# Patient Record
Sex: FEMALE | Race: AMERICAN INDIAN OR ALASKA NATIVE | NOT HISPANIC OR LATINO | Employment: FULL TIME | URBAN - METROPOLITAN AREA
[De-identification: names, ages, dates, MRNs, and addresses within clinical notes are randomized per-mention and may not be internally consistent; named-entity substitution may affect disease eponyms.]

---

## 2017-02-11 ENCOUNTER — OFFICE VISIT - HEALTHEAST (OUTPATIENT)
Dept: FAMILY MEDICINE | Facility: CLINIC | Age: 32
End: 2017-02-11

## 2017-02-11 DIAGNOSIS — J01.00 ACUTE MAXILLARY SINUSITIS, RECURRENCE NOT SPECIFIED: ICD-10-CM

## 2017-02-13 ENCOUNTER — COMMUNICATION - HEALTHEAST (OUTPATIENT)
Dept: SCHEDULING | Facility: CLINIC | Age: 32
End: 2017-02-13

## 2017-02-14 ENCOUNTER — OFFICE VISIT - HEALTHEAST (OUTPATIENT)
Dept: FAMILY MEDICINE | Facility: CLINIC | Age: 32
End: 2017-02-14

## 2017-02-14 DIAGNOSIS — J32.9 SINUSITIS: ICD-10-CM

## 2017-04-06 ENCOUNTER — OFFICE VISIT - HEALTHEAST (OUTPATIENT)
Dept: FAMILY MEDICINE | Facility: CLINIC | Age: 32
End: 2017-04-06

## 2017-04-06 DIAGNOSIS — M75.52 SHOULDER BURSITIS, LEFT: ICD-10-CM

## 2017-10-05 ENCOUNTER — OFFICE VISIT - HEALTHEAST (OUTPATIENT)
Dept: INTERNAL MEDICINE | Facility: CLINIC | Age: 32
End: 2017-10-05

## 2017-10-05 DIAGNOSIS — Z11.3 ROUTINE SCREENING FOR STI (SEXUALLY TRANSMITTED INFECTION): ICD-10-CM

## 2017-10-05 DIAGNOSIS — E89.0 POSTSURGICAL HYPOTHYROIDISM: ICD-10-CM

## 2017-10-05 LAB — HIV 1+2 AB+HIV1 P24 AG SERPL QL IA: NEGATIVE

## 2017-10-05 ASSESSMENT — MIFFLIN-ST. JEOR: SCORE: 1387.35

## 2017-10-06 LAB
HCV AB SERPL QL IA: NEGATIVE
SYPHILIS RPR SCREEN - HISTORICAL: NORMAL

## 2017-10-09 ENCOUNTER — COMMUNICATION - HEALTHEAST (OUTPATIENT)
Dept: INTERNAL MEDICINE | Facility: CLINIC | Age: 32
End: 2017-10-09

## 2017-10-09 DIAGNOSIS — E03.9 HYPOTHYROIDISM: ICD-10-CM

## 2018-01-17 ENCOUNTER — COMMUNICATION - HEALTHEAST (OUTPATIENT)
Dept: FAMILY MEDICINE | Facility: CLINIC | Age: 33
End: 2018-01-17

## 2018-06-04 ENCOUNTER — COMMUNICATION - HEALTHEAST (OUTPATIENT)
Dept: INTERNAL MEDICINE | Facility: CLINIC | Age: 33
End: 2018-06-04

## 2018-06-04 DIAGNOSIS — E03.9 HYPOTHYROIDISM: ICD-10-CM

## 2018-06-07 ENCOUNTER — COMMUNICATION - HEALTHEAST (OUTPATIENT)
Dept: INTERNAL MEDICINE | Facility: CLINIC | Age: 33
End: 2018-06-07

## 2018-10-04 ENCOUNTER — COMMUNICATION - HEALTHEAST (OUTPATIENT)
Dept: ADMINISTRATIVE | Facility: CLINIC | Age: 33
End: 2018-10-04

## 2018-10-04 ENCOUNTER — OFFICE VISIT - HEALTHEAST (OUTPATIENT)
Dept: FAMILY MEDICINE | Facility: CLINIC | Age: 33
End: 2018-10-04

## 2018-10-04 ENCOUNTER — COMMUNICATION - HEALTHEAST (OUTPATIENT)
Dept: FAMILY MEDICINE | Facility: CLINIC | Age: 33
End: 2018-10-04

## 2018-10-04 ENCOUNTER — AMBULATORY - HEALTHEAST (OUTPATIENT)
Dept: FAMILY MEDICINE | Facility: CLINIC | Age: 33
End: 2018-10-04

## 2018-10-04 DIAGNOSIS — Z13.220 SCREENING FOR LIPID DISORDERS: ICD-10-CM

## 2018-10-04 DIAGNOSIS — E89.0 POSTOPERATIVE HYPOTHYROIDISM: ICD-10-CM

## 2018-10-04 DIAGNOSIS — E20.9 HYPOPARATHYROIDISM (H): ICD-10-CM

## 2018-10-04 DIAGNOSIS — M25.50 MULTIPLE JOINT PAIN: ICD-10-CM

## 2018-10-04 DIAGNOSIS — E05.00 GRAVES DISEASE: ICD-10-CM

## 2018-10-04 DIAGNOSIS — N92.0 HEAVY PERIODS: ICD-10-CM

## 2018-10-04 LAB
ALBUMIN SERPL-MCNC: 4.3 G/DL (ref 3.5–5)
ALP SERPL-CCNC: 75 U/L (ref 45–120)
ALT SERPL W P-5'-P-CCNC: 18 U/L (ref 0–45)
ANION GAP SERPL CALCULATED.3IONS-SCNC: 9 MMOL/L (ref 5–18)
AST SERPL W P-5'-P-CCNC: 9 U/L (ref 0–40)
BASOPHILS # BLD AUTO: 0 THOU/UL (ref 0–0.2)
BASOPHILS NFR BLD AUTO: 1 % (ref 0–2)
BILIRUB SERPL-MCNC: 0.4 MG/DL (ref 0–1)
BUN SERPL-MCNC: 13 MG/DL (ref 8–22)
CALCIUM SERPL-MCNC: 9 MG/DL (ref 8.5–10.5)
CHLORIDE BLD-SCNC: 104 MMOL/L (ref 98–107)
CHOLEST SERPL-MCNC: 221 MG/DL
CO2 SERPL-SCNC: 26 MMOL/L (ref 22–31)
CREAT SERPL-MCNC: 0.68 MG/DL (ref 0.6–1.1)
EOSINOPHIL # BLD AUTO: 0.2 THOU/UL (ref 0–0.4)
EOSINOPHIL NFR BLD AUTO: 4 % (ref 0–6)
ERYTHROCYTE [DISTWIDTH] IN BLOOD BY AUTOMATED COUNT: 11 % (ref 11–14.5)
ESTRADIOL SERPL-MCNC: 88 PG/ML
FASTING STATUS PATIENT QL REPORTED: YES
FSH SERPL-ACNC: 3.7 MIU/ML
GFR SERPL CREATININE-BSD FRML MDRD: >60 ML/MIN/1.73M2
GLUCOSE BLD-MCNC: 85 MG/DL (ref 70–125)
HCT VFR BLD AUTO: 36 % (ref 35–47)
HDLC SERPL-MCNC: 52 MG/DL
HGB BLD-MCNC: 12.1 G/DL (ref 12–16)
LDLC SERPL CALC-MCNC: 145 MG/DL
LH SERPL-ACNC: 10.6 MIU/ML
LYMPHOCYTES # BLD AUTO: 2.2 THOU/UL (ref 0.8–4.4)
LYMPHOCYTES NFR BLD AUTO: 33 % (ref 20–40)
MCH RBC QN AUTO: 30.9 PG (ref 27–34)
MCHC RBC AUTO-ENTMCNC: 33.7 G/DL (ref 32–36)
MCV RBC AUTO: 91 FL (ref 80–100)
MONOCYTES # BLD AUTO: 0.4 THOU/UL (ref 0–0.9)
MONOCYTES NFR BLD AUTO: 7 % (ref 2–10)
NEUTROPHILS # BLD AUTO: 3.7 THOU/UL (ref 2–7.7)
NEUTROPHILS NFR BLD AUTO: 57 % (ref 50–70)
PLATELET # BLD AUTO: 272 THOU/UL (ref 140–440)
PMV BLD AUTO: 7.6 FL (ref 7–10)
POTASSIUM BLD-SCNC: 4 MMOL/L (ref 3.5–5)
PROGEST SERPL-MCNC: 5.3 NG/ML
PROT SERPL-MCNC: 7.8 G/DL (ref 6–8)
RBC # BLD AUTO: 3.94 MILL/UL (ref 3.8–5.4)
SODIUM SERPL-SCNC: 139 MMOL/L (ref 136–145)
T4 FREE SERPL-MCNC: 1 NG/DL (ref 0.7–1.8)
TRIGL SERPL-MCNC: 122 MG/DL
TSH SERPL DL<=0.005 MIU/L-ACNC: 5.88 UIU/ML (ref 0.3–5)
WBC: 6.6 THOU/UL (ref 4–11)

## 2018-10-04 ASSESSMENT — MIFFLIN-ST. JEOR: SCORE: 1387.35

## 2018-11-02 ENCOUNTER — OFFICE VISIT - HEALTHEAST (OUTPATIENT)
Dept: FAMILY MEDICINE | Facility: CLINIC | Age: 33
End: 2018-11-02

## 2018-11-02 DIAGNOSIS — L72.0 EPIDERMAL CYST: ICD-10-CM

## 2018-11-09 ENCOUNTER — COMMUNICATION - HEALTHEAST (OUTPATIENT)
Dept: FAMILY MEDICINE | Facility: CLINIC | Age: 33
End: 2018-11-09

## 2018-11-09 ENCOUNTER — RECORDS - HEALTHEAST (OUTPATIENT)
Dept: ADMINISTRATIVE | Facility: OTHER | Age: 33
End: 2018-11-09

## 2018-11-09 ENCOUNTER — AMBULATORY - HEALTHEAST (OUTPATIENT)
Dept: FAMILY MEDICINE | Facility: CLINIC | Age: 33
End: 2018-11-09

## 2018-11-09 DIAGNOSIS — L72.0 EPIDERMAL CYST: ICD-10-CM

## 2018-11-20 ENCOUNTER — OFFICE VISIT - HEALTHEAST (OUTPATIENT)
Dept: FAMILY MEDICINE | Facility: CLINIC | Age: 33
End: 2018-11-20

## 2018-11-20 DIAGNOSIS — L72.0 EPIDERMAL CYST: ICD-10-CM

## 2018-11-20 DIAGNOSIS — Z48.02 VISIT FOR SUTURE REMOVAL: ICD-10-CM

## 2018-12-17 ENCOUNTER — OFFICE VISIT - HEALTHEAST (OUTPATIENT)
Dept: FAMILY MEDICINE | Facility: CLINIC | Age: 33
End: 2018-12-17

## 2018-12-17 DIAGNOSIS — G47.09 OTHER INSOMNIA: ICD-10-CM

## 2018-12-17 DIAGNOSIS — E03.4 HYPOTHYROIDISM DUE TO ACQUIRED ATROPHY OF THYROID: ICD-10-CM

## 2018-12-17 LAB — TSH SERPL DL<=0.005 MIU/L-ACNC: 0.72 UIU/ML (ref 0.3–5)

## 2018-12-18 ENCOUNTER — COMMUNICATION - HEALTHEAST (OUTPATIENT)
Dept: FAMILY MEDICINE | Facility: CLINIC | Age: 33
End: 2018-12-18

## 2019-01-02 ENCOUNTER — COMMUNICATION - HEALTHEAST (OUTPATIENT)
Dept: FAMILY MEDICINE | Facility: CLINIC | Age: 34
End: 2019-01-02

## 2019-01-09 ENCOUNTER — COMMUNICATION - HEALTHEAST (OUTPATIENT)
Dept: FAMILY MEDICINE | Facility: CLINIC | Age: 34
End: 2019-01-09

## 2019-01-09 DIAGNOSIS — M62.838 MUSCLE SPASM: ICD-10-CM

## 2019-01-31 ENCOUNTER — COMMUNICATION - HEALTHEAST (OUTPATIENT)
Dept: ALLERGY | Facility: CLINIC | Age: 34
End: 2019-01-31

## 2019-02-05 ENCOUNTER — COMMUNICATION - HEALTHEAST (OUTPATIENT)
Dept: FAMILY MEDICINE | Facility: CLINIC | Age: 34
End: 2019-02-05

## 2019-02-05 DIAGNOSIS — M62.838 MUSCLE SPASM: ICD-10-CM

## 2019-03-06 ENCOUNTER — COMMUNICATION - HEALTHEAST (OUTPATIENT)
Dept: FAMILY MEDICINE | Facility: CLINIC | Age: 34
End: 2019-03-06

## 2019-03-06 DIAGNOSIS — E89.0 POSTOPERATIVE HYPOTHYROIDISM: ICD-10-CM

## 2019-03-14 ENCOUNTER — OFFICE VISIT - HEALTHEAST (OUTPATIENT)
Dept: FAMILY MEDICINE | Facility: CLINIC | Age: 34
End: 2019-03-14

## 2019-03-14 DIAGNOSIS — E20.89 OTHER HYPOPARATHYROIDISM (H): ICD-10-CM

## 2019-03-16 ENCOUNTER — COMMUNICATION - HEALTHEAST (OUTPATIENT)
Dept: FAMILY MEDICINE | Facility: CLINIC | Age: 34
End: 2019-03-16

## 2019-03-16 DIAGNOSIS — M62.838 MUSCLE SPASM: ICD-10-CM

## 2019-03-18 ENCOUNTER — COMMUNICATION - HEALTHEAST (OUTPATIENT)
Dept: FAMILY MEDICINE | Facility: CLINIC | Age: 34
End: 2019-03-18

## 2019-05-08 ENCOUNTER — COMMUNICATION - HEALTHEAST (OUTPATIENT)
Dept: FAMILY MEDICINE | Facility: CLINIC | Age: 34
End: 2019-05-08

## 2019-05-08 DIAGNOSIS — E89.0 POSTOPERATIVE HYPOTHYROIDISM: ICD-10-CM

## 2019-06-05 ENCOUNTER — COMMUNICATION - HEALTHEAST (OUTPATIENT)
Dept: FAMILY MEDICINE | Facility: CLINIC | Age: 34
End: 2019-06-05

## 2019-06-05 DIAGNOSIS — M62.838 MUSCLE SPASM: ICD-10-CM

## 2019-06-26 ENCOUNTER — COMMUNICATION - HEALTHEAST (OUTPATIENT)
Dept: FAMILY MEDICINE | Facility: CLINIC | Age: 34
End: 2019-06-26

## 2019-06-26 DIAGNOSIS — G47.00 INSOMNIA, UNSPECIFIED TYPE: ICD-10-CM

## 2019-06-26 DIAGNOSIS — M62.838 MUSCLE SPASM: ICD-10-CM

## 2019-06-28 ENCOUNTER — COMMUNICATION - HEALTHEAST (OUTPATIENT)
Dept: FAMILY MEDICINE | Facility: CLINIC | Age: 34
End: 2019-06-28

## 2019-07-03 ENCOUNTER — COMMUNICATION - HEALTHEAST (OUTPATIENT)
Dept: FAMILY MEDICINE | Facility: CLINIC | Age: 34
End: 2019-07-03

## 2019-07-03 DIAGNOSIS — G47.00 INSOMNIA, UNSPECIFIED TYPE: ICD-10-CM

## 2019-07-11 ENCOUNTER — OFFICE VISIT - HEALTHEAST (OUTPATIENT)
Dept: FAMILY MEDICINE | Facility: CLINIC | Age: 34
End: 2019-07-11

## 2019-07-11 ENCOUNTER — COMMUNICATION - HEALTHEAST (OUTPATIENT)
Dept: FAMILY MEDICINE | Facility: CLINIC | Age: 34
End: 2019-07-11

## 2019-07-11 DIAGNOSIS — M62.838 MUSCLE SPASM: ICD-10-CM

## 2019-07-11 DIAGNOSIS — E05.00 GRAVES DISEASE: ICD-10-CM

## 2019-07-11 DIAGNOSIS — M62.838 NECK MUSCLE SPASM: ICD-10-CM

## 2019-07-11 DIAGNOSIS — M77.8 TENDONITIS OF WRIST, LEFT: ICD-10-CM

## 2019-07-11 DIAGNOSIS — M77.8 TENDONITIS OF WRIST, RIGHT: ICD-10-CM

## 2019-07-11 ASSESSMENT — MIFFLIN-ST. JEOR: SCORE: 1369.66

## 2019-07-15 ENCOUNTER — COMMUNICATION - HEALTHEAST (OUTPATIENT)
Dept: FAMILY MEDICINE | Facility: CLINIC | Age: 34
End: 2019-07-15

## 2019-07-15 DIAGNOSIS — M25.539 PAIN IN WRIST, UNSPECIFIED LATERALITY: ICD-10-CM

## 2019-07-23 ENCOUNTER — COMMUNICATION - HEALTHEAST (OUTPATIENT)
Dept: FAMILY MEDICINE | Facility: CLINIC | Age: 34
End: 2019-07-23

## 2019-07-30 ENCOUNTER — OFFICE VISIT - HEALTHEAST (OUTPATIENT)
Dept: FAMILY MEDICINE | Facility: CLINIC | Age: 34
End: 2019-07-30

## 2019-07-30 ENCOUNTER — COMMUNICATION - HEALTHEAST (OUTPATIENT)
Dept: FAMILY MEDICINE | Facility: CLINIC | Age: 34
End: 2019-07-30

## 2019-07-30 DIAGNOSIS — F41.1 GAD (GENERALIZED ANXIETY DISORDER): ICD-10-CM

## 2019-07-30 DIAGNOSIS — G47.00 INSOMNIA, UNSPECIFIED TYPE: ICD-10-CM

## 2019-07-30 DIAGNOSIS — E05.00 GRAVES DISEASE: ICD-10-CM

## 2019-07-30 LAB — TSH SERPL DL<=0.005 MIU/L-ACNC: 0.48 UIU/ML (ref 0.3–5)

## 2019-08-01 ENCOUNTER — COMMUNICATION - HEALTHEAST (OUTPATIENT)
Dept: FAMILY MEDICINE | Facility: CLINIC | Age: 34
End: 2019-08-01

## 2019-08-01 DIAGNOSIS — M62.838 MUSCLE SPASM: ICD-10-CM

## 2019-09-17 ENCOUNTER — COMMUNICATION - HEALTHEAST (OUTPATIENT)
Dept: FAMILY MEDICINE | Facility: CLINIC | Age: 34
End: 2019-09-17

## 2019-09-17 ENCOUNTER — COMMUNICATION - HEALTHEAST (OUTPATIENT)
Dept: SCHEDULING | Facility: CLINIC | Age: 34
End: 2019-09-17

## 2019-09-17 ENCOUNTER — OFFICE VISIT - HEALTHEAST (OUTPATIENT)
Dept: FAMILY MEDICINE | Facility: CLINIC | Age: 34
End: 2019-09-17

## 2019-09-17 DIAGNOSIS — J30.89 NON-SEASONAL ALLERGIC RHINITIS DUE TO OTHER ALLERGIC TRIGGER: ICD-10-CM

## 2019-09-17 DIAGNOSIS — J45.901 MILD ASTHMA WITH EXACERBATION, UNSPECIFIED WHETHER PERSISTENT: ICD-10-CM

## 2019-09-17 DIAGNOSIS — R07.89 BURNING CHEST PAIN: ICD-10-CM

## 2019-09-17 DIAGNOSIS — M62.838 MUSCLE SPASM: ICD-10-CM

## 2019-10-08 ENCOUNTER — HOSPITAL ENCOUNTER (OUTPATIENT)
Dept: RESPIRATORY THERAPY | Facility: HOSPITAL | Age: 34
Discharge: HOME | End: 2019-10-08

## 2019-10-08 ENCOUNTER — COMMUNICATION - HEALTHEAST (OUTPATIENT)
Dept: FAMILY MEDICINE | Facility: CLINIC | Age: 34
End: 2019-10-08

## 2019-10-08 DIAGNOSIS — J45.901 MILD ASTHMA WITH EXACERBATION, UNSPECIFIED WHETHER PERSISTENT: ICD-10-CM

## 2019-10-08 LAB — HGB BLD-MCNC: 11.9 G/DL (ref 12–16)

## 2019-10-10 ENCOUNTER — COMMUNICATION - HEALTHEAST (OUTPATIENT)
Dept: FAMILY MEDICINE | Facility: CLINIC | Age: 34
End: 2019-10-10

## 2019-11-08 ENCOUNTER — COMMUNICATION - HEALTHEAST (OUTPATIENT)
Dept: FAMILY MEDICINE | Facility: CLINIC | Age: 34
End: 2019-11-08

## 2019-11-08 DIAGNOSIS — G47.00 INSOMNIA, UNSPECIFIED TYPE: ICD-10-CM

## 2019-11-08 DIAGNOSIS — E89.0 POSTOPERATIVE HYPOTHYROIDISM: ICD-10-CM

## 2019-12-04 ENCOUNTER — COMMUNICATION - HEALTHEAST (OUTPATIENT)
Dept: FAMILY MEDICINE | Facility: CLINIC | Age: 34
End: 2019-12-04

## 2019-12-04 DIAGNOSIS — G47.00 INSOMNIA, UNSPECIFIED TYPE: ICD-10-CM

## 2019-12-09 ENCOUNTER — OFFICE VISIT - HEALTHEAST (OUTPATIENT)
Dept: FAMILY MEDICINE | Facility: CLINIC | Age: 34
End: 2019-12-09

## 2019-12-09 DIAGNOSIS — J02.9 SORE THROAT: ICD-10-CM

## 2019-12-09 DIAGNOSIS — J01.40 ACUTE NON-RECURRENT PANSINUSITIS: ICD-10-CM

## 2019-12-09 LAB — DEPRECATED S PYO AG THROAT QL EIA: NORMAL

## 2019-12-10 LAB — GROUP A STREP BY PCR: NORMAL

## 2020-01-07 ENCOUNTER — COMMUNICATION - HEALTHEAST (OUTPATIENT)
Dept: LAB | Facility: CLINIC | Age: 35
End: 2020-01-07

## 2020-01-07 DIAGNOSIS — E03.9 ACQUIRED HYPOTHYROIDISM: ICD-10-CM

## 2020-01-07 DIAGNOSIS — Z13.21 ENCOUNTER FOR VITAMIN DEFICIENCY SCREENING: ICD-10-CM

## 2020-01-09 ENCOUNTER — AMBULATORY - HEALTHEAST (OUTPATIENT)
Dept: LAB | Facility: CLINIC | Age: 35
End: 2020-01-09

## 2020-01-09 DIAGNOSIS — Z13.21 ENCOUNTER FOR VITAMIN DEFICIENCY SCREENING: ICD-10-CM

## 2020-01-09 DIAGNOSIS — E03.9 ACQUIRED HYPOTHYROIDISM: ICD-10-CM

## 2020-01-09 LAB
CALCIUM SERPL-MCNC: 8.6 MG/DL (ref 8.5–10.5)
TSH SERPL DL<=0.005 MIU/L-ACNC: 1.35 UIU/ML (ref 0.3–5)

## 2020-01-10 ENCOUNTER — COMMUNICATION - HEALTHEAST (OUTPATIENT)
Dept: FAMILY MEDICINE | Facility: CLINIC | Age: 35
End: 2020-01-10

## 2020-01-10 DIAGNOSIS — R79.89 LOW VITAMIN D LEVEL: ICD-10-CM

## 2020-01-10 LAB — 25(OH)D3 SERPL-MCNC: 19.9 NG/ML (ref 30–80)

## 2020-01-16 ENCOUNTER — COMMUNICATION - HEALTHEAST (OUTPATIENT)
Dept: FAMILY MEDICINE | Facility: CLINIC | Age: 35
End: 2020-01-16

## 2020-02-01 ENCOUNTER — COMMUNICATION - HEALTHEAST (OUTPATIENT)
Dept: FAMILY MEDICINE | Facility: CLINIC | Age: 35
End: 2020-02-01

## 2020-02-01 DIAGNOSIS — M62.838 MUSCLE SPASM: ICD-10-CM

## 2020-03-25 ENCOUNTER — COMMUNICATION - HEALTHEAST (OUTPATIENT)
Dept: FAMILY MEDICINE | Facility: CLINIC | Age: 35
End: 2020-03-25

## 2020-03-25 DIAGNOSIS — G47.00 INSOMNIA, UNSPECIFIED TYPE: ICD-10-CM

## 2020-03-27 ENCOUNTER — COMMUNICATION - HEALTHEAST (OUTPATIENT)
Dept: FAMILY MEDICINE | Facility: CLINIC | Age: 35
End: 2020-03-27

## 2020-04-21 ENCOUNTER — COMMUNICATION - HEALTHEAST (OUTPATIENT)
Dept: FAMILY MEDICINE | Facility: CLINIC | Age: 35
End: 2020-04-21

## 2020-04-21 ENCOUNTER — RECORDS - HEALTHEAST (OUTPATIENT)
Dept: FAMILY MEDICINE | Facility: CLINIC | Age: 35
End: 2020-04-21

## 2020-04-21 DIAGNOSIS — M62.838 MUSCLE SPASM: ICD-10-CM

## 2020-04-21 DIAGNOSIS — R52 PAIN: ICD-10-CM

## 2020-04-21 DIAGNOSIS — G47.00 INSOMNIA, UNSPECIFIED TYPE: ICD-10-CM

## 2020-04-22 ENCOUNTER — COMMUNICATION - HEALTHEAST (OUTPATIENT)
Dept: FAMILY MEDICINE | Facility: CLINIC | Age: 35
End: 2020-04-22

## 2020-04-22 DIAGNOSIS — G47.00 INSOMNIA, UNSPECIFIED TYPE: ICD-10-CM

## 2020-04-24 ENCOUNTER — COMMUNICATION - HEALTHEAST (OUTPATIENT)
Dept: FAMILY MEDICINE | Facility: CLINIC | Age: 35
End: 2020-04-24

## 2020-04-24 DIAGNOSIS — G47.00 INSOMNIA, UNSPECIFIED TYPE: ICD-10-CM

## 2020-04-29 ENCOUNTER — OFFICE VISIT - HEALTHEAST (OUTPATIENT)
Dept: FAMILY MEDICINE | Facility: CLINIC | Age: 35
End: 2020-04-29

## 2020-04-29 DIAGNOSIS — Z20.822 EXPOSURE TO COVID-19 VIRUS: ICD-10-CM

## 2020-04-29 DIAGNOSIS — M65.30 TRIGGER FINGER, ACQUIRED: ICD-10-CM

## 2020-04-29 DIAGNOSIS — G47.00 INSOMNIA, UNSPECIFIED TYPE: ICD-10-CM

## 2020-07-06 ENCOUNTER — COMMUNICATION - HEALTHEAST (OUTPATIENT)
Dept: SCHEDULING | Facility: CLINIC | Age: 35
End: 2020-07-06

## 2020-07-07 ENCOUNTER — OFFICE VISIT - HEALTHEAST (OUTPATIENT)
Dept: FAMILY MEDICINE | Facility: CLINIC | Age: 35
End: 2020-07-07

## 2020-07-07 ENCOUNTER — HOSPITAL ENCOUNTER (OUTPATIENT)
Dept: ULTRASOUND IMAGING | Facility: HOSPITAL | Age: 35
Discharge: HOME | End: 2020-07-07
Attending: FAMILY MEDICINE

## 2020-07-07 DIAGNOSIS — Z20.822 EXPOSURE TO COVID-19 VIRUS: ICD-10-CM

## 2020-07-07 DIAGNOSIS — R10.2 PELVIC PAIN IN FEMALE: ICD-10-CM

## 2020-07-07 DIAGNOSIS — E89.0 POSTSURGICAL HYPOTHYROIDISM: ICD-10-CM

## 2020-07-07 LAB
CLUE CELLS: NORMAL
ERYTHROCYTE [DISTWIDTH] IN BLOOD BY AUTOMATED COUNT: 12.1 % (ref 11–14.5)
FERRITIN SERPL-MCNC: 21 NG/ML (ref 10–130)
HCT VFR BLD AUTO: 35.4 % (ref 35–47)
HGB BLD-MCNC: 12.1 G/DL (ref 12–16)
MCH RBC QN AUTO: 30.9 PG (ref 27–34)
MCHC RBC AUTO-ENTMCNC: 34.1 G/DL (ref 32–36)
MCV RBC AUTO: 90 FL (ref 80–100)
PLATELET # BLD AUTO: 301 THOU/UL (ref 140–440)
PMV BLD AUTO: 7.4 FL (ref 7–10)
RBC # BLD AUTO: 3.91 MILL/UL (ref 3.8–5.4)
TRICHOMONAS, WET PREP: NORMAL
TSH SERPL DL<=0.005 MIU/L-ACNC: 1.34 UIU/ML (ref 0.3–5)
WBC: 9.3 THOU/UL (ref 4–11)
YEAST, WET PREP: NORMAL

## 2020-07-07 ASSESSMENT — MIFFLIN-ST. JEOR: SCORE: 1346.53

## 2020-07-08 LAB
C TRACH DNA SPEC QL PROBE+SIG AMP: NEGATIVE
HPV SOURCE: NORMAL
HUMAN PAPILLOMA VIRUS 16 DNA: NEGATIVE
HUMAN PAPILLOMA VIRUS 18 DNA: NEGATIVE
HUMAN PAPILLOMA VIRUS FINAL DIAGNOSIS: NORMAL
HUMAN PAPILLOMA VIRUS OTHER HR: NEGATIVE
N GONORRHOEA DNA SPEC QL NAA+PROBE: NEGATIVE
SPECIMEN DESCRIPTION: NORMAL

## 2020-07-09 ENCOUNTER — COMMUNICATION - HEALTHEAST (OUTPATIENT)
Dept: SCHEDULING | Facility: CLINIC | Age: 35
End: 2020-07-09

## 2020-07-10 ENCOUNTER — COMMUNICATION - HEALTHEAST (OUTPATIENT)
Dept: FAMILY MEDICINE | Facility: CLINIC | Age: 35
End: 2020-07-10

## 2020-07-14 LAB
BKR LAB AP ABNORMAL BLEEDING: NO
BKR LAB AP BIRTH CONTROL/HORMONES: NORMAL
BKR LAB AP CERVICAL APPEARANCE: NORMAL
BKR LAB AP GYN ADEQUACY: NORMAL
BKR LAB AP GYN INTERPRETATION: NORMAL
BKR LAB AP HPV REFLEX: NORMAL
BKR LAB AP LMP: NORMAL
BKR LAB AP PATIENT STATUS: NO
BKR LAB AP PREVIOUS ABNORMAL: NO
BKR LAB AP PREVIOUS NORMAL: NORMAL
HIGH RISK?: NO
PATH REPORT.COMMENTS IMP SPEC: NORMAL
RESULT FLAG (HE HISTORICAL CONVERSION): NORMAL

## 2020-07-20 ENCOUNTER — AMBULATORY - HEALTHEAST (OUTPATIENT)
Dept: SURGERY | Facility: HOSPITAL | Age: 35
End: 2020-07-20

## 2020-07-20 DIAGNOSIS — Z11.59 ENCOUNTER FOR SCREENING FOR OTHER VIRAL DISEASES: ICD-10-CM

## 2020-07-31 ENCOUNTER — OFFICE VISIT - HEALTHEAST (OUTPATIENT)
Dept: FAMILY MEDICINE | Facility: CLINIC | Age: 35
End: 2020-07-31

## 2020-07-31 ENCOUNTER — AMBULATORY - HEALTHEAST (OUTPATIENT)
Dept: LAB | Facility: CLINIC | Age: 35
End: 2020-07-31

## 2020-07-31 DIAGNOSIS — Z01.818 PRE-OP EXAM: ICD-10-CM

## 2020-07-31 DIAGNOSIS — D25.9 UTERINE LEIOMYOMA, UNSPECIFIED LOCATION: ICD-10-CM

## 2020-07-31 LAB
ANION GAP SERPL CALCULATED.3IONS-SCNC: 8 MMOL/L (ref 5–18)
BUN SERPL-MCNC: 14 MG/DL (ref 8–22)
CALCIUM SERPL-MCNC: 8.6 MG/DL (ref 8.5–10.5)
CHLORIDE BLD-SCNC: 104 MMOL/L (ref 98–107)
CO2 SERPL-SCNC: 25 MMOL/L (ref 22–31)
CREAT SERPL-MCNC: 0.65 MG/DL (ref 0.6–1.1)
ERYTHROCYTE [DISTWIDTH] IN BLOOD BY AUTOMATED COUNT: 11.3 % (ref 11–14.5)
GFR SERPL CREATININE-BSD FRML MDRD: >60 ML/MIN/1.73M2
GLUCOSE BLD-MCNC: 78 MG/DL (ref 70–125)
HCT VFR BLD AUTO: 36.1 % (ref 35–47)
HGB BLD-MCNC: 12.4 G/DL (ref 12–16)
MCH RBC QN AUTO: 30.4 PG (ref 27–34)
MCHC RBC AUTO-ENTMCNC: 34.5 G/DL (ref 32–36)
MCV RBC AUTO: 88 FL (ref 80–100)
PLATELET # BLD AUTO: 270 THOU/UL (ref 140–440)
PMV BLD AUTO: 7.4 FL (ref 7–10)
POTASSIUM BLD-SCNC: 3.9 MMOL/L (ref 3.5–5)
RBC # BLD AUTO: 4.1 MILL/UL (ref 3.8–5.4)
SODIUM SERPL-SCNC: 137 MMOL/L (ref 136–145)
WBC: 7.4 THOU/UL (ref 4–11)

## 2020-07-31 ASSESSMENT — MIFFLIN-ST. JEOR: SCORE: 1354.58

## 2020-08-01 ENCOUNTER — COMMUNICATION - HEALTHEAST (OUTPATIENT)
Dept: LAB | Facility: CLINIC | Age: 35
End: 2020-08-01

## 2020-08-01 DIAGNOSIS — Z01.818 PRE-OP EXAM: ICD-10-CM

## 2020-08-03 ENCOUNTER — COMMUNICATION - HEALTHEAST (OUTPATIENT)
Dept: FAMILY MEDICINE | Facility: CLINIC | Age: 35
End: 2020-08-03

## 2020-08-03 DIAGNOSIS — G47.00 INSOMNIA, UNSPECIFIED TYPE: ICD-10-CM

## 2020-08-09 ENCOUNTER — AMBULATORY - HEALTHEAST (OUTPATIENT)
Dept: FAMILY MEDICINE | Facility: CLINIC | Age: 35
End: 2020-08-09

## 2020-08-09 DIAGNOSIS — Z11.59 ENCOUNTER FOR SCREENING FOR OTHER VIRAL DISEASES: ICD-10-CM

## 2020-08-10 LAB
SARS-COV-2 PCR COMMENT: NORMAL
SARS-COV-2 RNA SPEC QL NAA+PROBE: NEGATIVE
SARS-COV-2 VIRUS SPECIMEN SOURCE: NORMAL

## 2020-08-10 ASSESSMENT — MIFFLIN-ST. JEOR: SCORE: 1351.13

## 2020-08-11 ENCOUNTER — ANESTHESIA - HEALTHEAST (OUTPATIENT)
Dept: SURGERY | Facility: HOSPITAL | Age: 35
End: 2020-08-11

## 2020-08-12 ENCOUNTER — COMMUNICATION - HEALTHEAST (OUTPATIENT)
Dept: SCHEDULING | Facility: CLINIC | Age: 35
End: 2020-08-12

## 2020-08-12 ENCOUNTER — SURGERY - HEALTHEAST (OUTPATIENT)
Dept: SURGERY | Facility: HOSPITAL | Age: 35
End: 2020-08-12

## 2020-08-12 ASSESSMENT — MIFFLIN-ST. JEOR: SCORE: 1344.54

## 2020-09-27 ENCOUNTER — COMMUNICATION - HEALTHEAST (OUTPATIENT)
Dept: SCHEDULING | Facility: CLINIC | Age: 35
End: 2020-09-27

## 2020-09-27 ASSESSMENT — MIFFLIN-ST. JEOR
SCORE: 1329.53
SCORE: 1330.89

## 2020-09-28 ENCOUNTER — ANESTHESIA - HEALTHEAST (OUTPATIENT)
Dept: SURGERY | Facility: HOSPITAL | Age: 35
End: 2020-09-28

## 2020-09-28 ENCOUNTER — SURGERY - HEALTHEAST (OUTPATIENT)
Dept: SURGERY | Facility: HOSPITAL | Age: 35
End: 2020-09-28

## 2020-09-29 ENCOUNTER — COMMUNICATION - HEALTHEAST (OUTPATIENT)
Dept: SCHEDULING | Facility: CLINIC | Age: 35
End: 2020-09-29

## 2020-10-01 ENCOUNTER — NURSE TRIAGE (OUTPATIENT)
Dept: NURSING | Facility: CLINIC | Age: 35
End: 2020-10-01

## 2020-10-01 ENCOUNTER — COMMUNICATION - HEALTHEAST (OUTPATIENT)
Dept: SCHEDULING | Facility: CLINIC | Age: 35
End: 2020-10-01

## 2020-10-02 ENCOUNTER — COMMUNICATION - HEALTHEAST (OUTPATIENT)
Dept: SCHEDULING | Facility: CLINIC | Age: 35
End: 2020-10-02

## 2020-10-14 ENCOUNTER — COMMUNICATION - HEALTHEAST (OUTPATIENT)
Dept: HEALTH INFORMATION MANAGEMENT | Facility: CLINIC | Age: 35
End: 2020-10-14

## 2020-10-28 ENCOUNTER — COMMUNICATION - HEALTHEAST (OUTPATIENT)
Dept: SURGERY | Facility: CLINIC | Age: 35
End: 2020-10-28

## 2021-03-19 ENCOUNTER — OFFICE VISIT - HEALTHEAST (OUTPATIENT)
Dept: FAMILY MEDICINE | Facility: CLINIC | Age: 36
End: 2021-03-19

## 2021-03-19 ENCOUNTER — AMBULATORY - HEALTHEAST (OUTPATIENT)
Dept: FAMILY MEDICINE | Facility: CLINIC | Age: 36
End: 2021-03-19

## 2021-03-19 DIAGNOSIS — Z20.822 SUSPECTED COVID-19 VIRUS INFECTION: ICD-10-CM

## 2021-03-21 ENCOUNTER — COMMUNICATION - HEALTHEAST (OUTPATIENT)
Dept: SCHEDULING | Facility: CLINIC | Age: 36
End: 2021-03-21

## 2021-04-15 ENCOUNTER — COMMUNICATION - HEALTHEAST (OUTPATIENT)
Dept: FAMILY MEDICINE | Facility: CLINIC | Age: 36
End: 2021-04-15

## 2021-04-15 DIAGNOSIS — G47.00 INSOMNIA, UNSPECIFIED TYPE: ICD-10-CM

## 2021-05-28 ASSESSMENT — ASTHMA QUESTIONNAIRES: ACT_TOTALSCORE: 23

## 2021-05-28 NOTE — TELEPHONE ENCOUNTER
Refill Approved    Rx renewed per Medication Renewal Policy. Medication was last renewed on 3/8/19.    Jelly Schwartz, [facility] Triage/Med Refill 5/9/2019     Requested Prescriptions   Pending Prescriptions Disp Refills     levothyroxine (SYNTHROID, LEVOTHROID) 125 MCG tablet [Pharmacy Med Name: LEVOTHYROXINE 0.125MG (125MCG) TAB] 60 tablet 0     Sig: TAKE 1 TABLET BY MOUTH DAILY       Thyroid Hormones Protocol Passed - 5/8/2019  8:51 PM        Passed - Provider visit in past 12 months or next 3 months     Last office visit with prescriber/PCP: 3/14/2019 Doreen Meeks PA-C OR same dept: 3/14/2019 Doreen Meeks PA-C OR same specialty: 3/14/2019 Doreen Meeks PA-C  Last physical: Visit date not found Last MTM visit: Visit date not found   Next visit within 3 mo: Visit date not found  Next physical within 3 mo: Visit date not found  Prescriber OR PCP: Doreen Meeks PA-C  Last diagnosis associated with med order: 1. Postoperative hypothyroidism  - levothyroxine (SYNTHROID, LEVOTHROID) 125 MCG tablet [Pharmacy Med Name: LEVOTHYROXINE 0.125MG (125MCG) TAB]; TAKE 1 TABLET BY MOUTH DAILY  Dispense: 60 tablet; Refill: 0    If protocol passes may refill for 12 months if within 3 months of last provider visit (or a total of 15 months).             Passed - TSH on file in past 12 months for patient age 12 & older     TSH   Date Value Ref Range Status   12/17/2018 0.72 0.30 - 5.00 uIU/mL Final

## 2021-05-29 ENCOUNTER — RECORDS - HEALTHEAST (OUTPATIENT)
Dept: ADMINISTRATIVE | Facility: CLINIC | Age: 36
End: 2021-05-29

## 2021-05-29 NOTE — TELEPHONE ENCOUNTER
RN cannot approve Refill Request    RN can NOT refill this medication med is not covered by policy/route to provider. Last office visit: 3/14/2019 Doreen Meeks PA-C Last Physical: Visit date not found Last MTM visit: Visit date not found Last visit same specialty: 3/14/2019 Doreen Meeks PA-C.  Next visit within 3 mo: Visit date not found  Next physical within 3 mo: Visit date not found      Fern Schreiber, [facility] Triage/Med Refill 6/6/2019    Requested Prescriptions   Pending Prescriptions Disp Refills     tiZANidine (ZANAFLEX) 4 MG tablet [Pharmacy Med Name: TIZANIDINE 4MG TABLETS] 30 tablet 0     Sig: TAKE 1 TABLET BY MOUTH ONCE DAILY AT BEDTIME AS NEEDED       There is no refill protocol information for this order

## 2021-05-30 ENCOUNTER — RECORDS - HEALTHEAST (OUTPATIENT)
Dept: ADMINISTRATIVE | Facility: CLINIC | Age: 36
End: 2021-05-30

## 2021-05-30 VITALS — WEIGHT: 168.4 LBS | BODY MASS INDEX: 29.36 KG/M2

## 2021-05-30 VITALS — WEIGHT: 168.7 LBS | BODY MASS INDEX: 29.42 KG/M2

## 2021-05-30 VITALS — BODY MASS INDEX: 28.79 KG/M2 | WEIGHT: 165.1 LBS

## 2021-05-30 NOTE — PROGRESS NOTES
HPI:  Kirti Sinha is a 34 y.o. female who is seen for   Chief Complaint   Patient presents with     Neck Pain     3 wk, off and on      Wrist Pain     6 month to a year    Kirti Sinha seen for lateral neck pain that radiates to bilateral hands.  Pain extends to second and third fingers bilaterally.  Hours at work, doing increased computer time, it is right side mouse at work and I left at home, has increased computer work at both places.  Her pain was severe last night 8 out of 10 in the lateral right neck area radiating to the right shoulder.  She has a history of right shoulder rotator cuff tear.  Her pain was worse after sleep, improved by Advil and Aleve.  At home she is also doing painting of recently renovated fireplace.  She states she did not do the rock work, her  did this.  She would also like to have a recheck of her TSH, has been approximately 6 months since her last check.  She has a history of Graves' disease and is concerned that possibly she is needing adjustment in her medication.  Patient denies chest pain, palpitations, shortness of breath, wheezing, cough, back pain, dysuria, flank pain, abdominal pain, nausea, vomiting, diarrhea, constipation, black or bloody stools, acid reflux, lower leg edema, claudication, muscle weakness, dizziness, headaches, change in vision, changes in hearing, tinnitus, nasal congestion, fever, weight loss, globus, dysphagia, increased urination, increased thirst, depression, anxiety.  No results found for: HGBA1C  Lab Results   Component Value Date    LDLCALC 145 (H) 10/04/2018    CREATININE 0.68 10/04/2018     Patient Active Problem List   Diagnosis     Graves' Disease     Postoperative Hypoparathyroidism     Postsurgical Hypothyroidism     Insomnia     Trigger finger     Polycystic ovary syndrome     Family History   Problem Relation Age of Onset     Diabetes type II Father      Fibroids Mother      Fibroids Maternal Aunt       Social History     Socioeconomic History     Marital status:      Spouse name: None     Number of children: 2     Years of education: None     Highest education level: None   Occupational History     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[employer]   Social Needs     Financial resource strain: None     Food insecurity:     Worry: None     Inability: None     Transportation needs:     Medical: None     Non-medical: None   Tobacco Use     Smoking status: Former Smoker     Last attempt to quit: 2012     Years since quittin.2     Smokeless tobacco: Never Used     Tobacco comment: former smoker   Substance and Sexual Activity     Alcohol use: Yes     Comment: 1-2 x monthly     Drug use: No     Sexual activity: Yes   Lifestyle     Physical activity:     Days per week: None     Minutes per session: None     Stress: None   Relationships     Social connections:     Talks on phone: None     Gets together: None     Attends Sikhism service: None     Active member of club or organization: None     Attends meetings of clubs or organizations: None     Relationship status: None     Intimate partner violence:     Fear of current or ex partner: None     Emotionally abused: None     Physically abused: None     Forced sexual activity: None   Other Topics Concern     None   Social History Narrative     None     Past Surgical History:   Procedure Laterality Date     CHOLECYSTECTOMY       [place] REMOVAL GALLBLADDER      Description: Cholecystectomy;  Recorded: 2013;     THYROIDECTOMY  2009     TONSILLECTOMY  2015     TOTAL THYROIDECTOMY       Current Outpatient Medications on File Prior to Visit   Medication Sig Dispense Refill     levothyroxine (SYNTHROID, LEVOTHROID) 125 MCG tablet TAKE 1 TABLET BY MOUTH DAILY 90 tablet 1     SUMAtriptan (IMITREX) 100 MG tablet Take 100 mg by mouth every 2 (two) hours as needed for migraine.       zolpidem (AMBIEN) 10 mg tablet Take 1 tablet (10 mg total) by mouth at bedtime as needed for  sleep. 30 tablet 0     No current facility-administered medications on file prior to visit.      Allergies   Allergen Reactions     Penicillins Hives     OB History    None       I have reviewed the patient's medical history in detail and updated the computerized patient record.  OBJECTIVE:  Wt Readings from Last 3 Encounters:   07/11/19 157 lb 1.6 oz (71.3 kg)   03/14/19 157 lb 9.6 oz (71.5 kg)   12/17/18 156 lb 11.2 oz (71.1 kg)     Temp Readings from Last 3 Encounters:   12/17/18 97.9  F (36.6  C) (Oral)   04/06/17 97.5  F (36.4  C) (Oral)   02/14/17 97.8  F (36.6  C) (Oral)     BP Readings from Last 3 Encounters:   07/11/19 110/68   03/14/19 118/70   12/17/18 115/62     Pulse Readings from Last 3 Encounters:   07/11/19 77   03/14/19 76   12/17/18 82     Body mass index is 27.39 kg/m .     Alert, cooperative, well-hydrated. Appears well.  Eyes: Pupils equal, round, reactive to light.  HEENT: Sclera white, nares patent, MMM, TM's pearly bilaterally  Neck: supple, without lymphadenopathy, Thyroid freely movable and without hypotrophy or nodularity.   Lungs: Clear to auscultation. No retractions, no increased work of respiration, equal chest rise.   Heart: Regular rate and rhythm, no murmurs, clicks,   Gallops.  Abdomen: Soft, bowel sounds in 4 quadrants with no tenderness to palpation, no organomegaly or masses, no aortic or renal bruits.  Extremities: no tenderness to palpation of gastrocnemius, bilaterally.  Skin: no increased warmth, edema, or erythema of lower legs bilaterally.  Back: No cervical, thoracic or lumbar tenderness to spinous processes or musculature.  Scalene spasms bilaterally, with upper trapezius tension.  Bilateral hands:  strength 5 out of 5 bilaterally, Tinel's and Phalen's negative, tenderness along second and third finger bilaterally with no palpable ganglions.  No increased edema or erythema.  Pulses: 2 out of 4 at ulnar and radial arteries.  Nailbeds pink to all 10 fingers.  Labs:  No  visits with results within 3 Month(s) from this visit.   Latest known visit with results is:   Office Visit on 12/17/2018   Component Date Value     TSH 12/17/2018 0.72      ASSESMENT/PLAN:  1. Tendonitis of wrist, left  Ambulatory referral to Adult PT- Internal    ibuprofen (ADVIL,MOTRIN) 600 MG tablet    Splint, Prefabricated, Wrist Or Ankle   2. Tendonitis of wrist, right  Ambulatory referral to Adult PT- Internal    ibuprofen (ADVIL,MOTRIN) 600 MG tablet    Splint, Prefabricated, Wrist Or Ankle   3. Neck muscle spasm  tiZANidine (ZANAFLEX) 4 MG tablet   4. Muscle spasm     Bilateral splints at night, Zanaflex as needed for spasm, discussed ergonomic adjustments of workstation, discussed stretches shoulder and neck during work activity, will get ambulatory referral to physical therapy and follow-up if symptoms do not improve.  Also suggested changing pillow and getting new mattress if it is old.  We will recheck thyroid testing.  Follow-up with primary care for ongoing treatment of thyroid disease.  Doreen Meeks, MS, PA-C 07/15/19

## 2021-05-30 NOTE — TELEPHONE ENCOUNTER
Neck pain started over a week ago    She remembers turning her neck to fast and the pain started after that.    The pain comes and goes    She feels that she cannot lift her arms with out pain    Pain is better when she is lying down    Rates her pain 3-4 out of 10 right now.    Last night pain was an 8-9 out of 10. Did take Tylenol and was able to sleep.    Pain radiates into both arms    No weakness    No numbness    No headache    No fever    No chest pain    No back pain    No swelling    No pregnancy    No shortness of breath     No bowel or bladder issues    Has been trying OTC medications and heat and ice but not helping much.    Appointment scheduled for today.    Fern Schreiber RN  [facility] Medication Refill and Triage Nurse  7/11/2019  8:33 AM      Reason for Disposition    MODERATE neck pain (e.g., interferes with normal activities like work or school)    Pain shoots (radiates) into arm or hand    Protocols used: NECK PAIN OR [id number]

## 2021-05-30 NOTE — TELEPHONE ENCOUNTER
Controlled Substance Refill Request  Medication:   Requested Prescriptions     Pending Prescriptions Disp Refills     zolpidem (AMBIEN) 10 mg tablet [Pharmacy Med Name: ZOLPIDEM 10MG TABLETS] 30 tablet 0     Sig: TAKE 1 TABLET BY MOUTH AT BEDTIME AS NEEDED FOR SLEEP.     Date Last Fill: 7.2.19  [facility]   Submit electronically to pharmacy  Controlled Substance Agreement on File:   Encounter-Level CSA Scan Date:    There are no encounter-level csa scan date.       Last office visit: Last office visit pertaining to requested medication was 3.14.19.

## 2021-05-30 NOTE — TELEPHONE ENCOUNTER
Controlled Substance Refill Request  Medication:   Requested Prescriptions     Pending Prescriptions Disp Refills     zolpidem (AMBIEN) 10 mg tablet [Pharmacy Med Name: ZOLPIDEM 10MG TABLETS] 30 tablet 0     Sig: TAKE 1 TABLET BY MOUTH AT BEDTIME AS NEEDED FOR SLEEP     Date Last Fill: 7/2/2019  Pharmacy: Change in pharmacy noted: [facility] on [address]  Submit electronically to pharmacy  Controlled Substance Agreement on File:   Encounter-Level CSA Scan Date:    There are no encounter-level csa scan date.       Last office visit: 7/11/2019. Last office visit pertaining to requested medication was 3/14/2019 with ALEX MURPHY=KASSANDRA

## 2021-05-30 NOTE — TELEPHONE ENCOUNTER
Controlled Substance Refill Request  Medication Name:   Requested Prescriptions     Pending Prescriptions Disp Refills     tiZANidine (ZANAFLEX) 4 MG tablet 30 tablet 0     Sig: TAKE 1 TABLET BY MOUTH ONCE DAILY AT BEDTIME AS NEEDED     zolpidem (AMBIEN) 10 mg tablet 30 tablet 3     Sig: Take 1 tablet (10 mg total) by mouth at bedtime as needed for sleep.     Date Last Fill: 6/10/2019 Zanaflex and 3/18/2019 Ambien  [facility] for [facility]   Submit electronically to pharmacy  Controlled Substance Agreement Date Scanned:   Encounter-Level CSA Scan Date:    There are no encounter-level csa scan date.       Last office visit with prescriber/PCP: 3/14/2019 Doreen Meeks PA-C OR same dept: 3/14/2019 Doreen Meeks PA-C OR same specialty: 3/14/2019 Doreen Meeks PA-C  Last physical: Visit date not found Last [facility] visit: Visit date not found

## 2021-05-30 NOTE — TELEPHONE ENCOUNTER
Controlled Substance Refill Request  Medication:   Requested Prescriptions     Pending Prescriptions Disp Refills     zolpidem (AMBIEN) 10 mg tablet [Pharmacy Med Name: ZOLPIDEM 10MG TABLETS] 30 tablet 0     Sig: TAKE 1 TABLET BY MOUTH AT BEDTIME AS NEEDED FOR SLEEP     Date Last Fill: 3/18/19 #30 with 3 refills  Pharmacy: [facility] in [place], [place]   Submit electronically to pharmacy  Controlled Substance Agreement on File: 12/17/18  Encounter-Level CSA Scan Date:    There are no encounter-level csa scan date.       Last office visit: 3/14/2019 Doreen Meeks PA-C

## 2021-05-31 VITALS — BODY MASS INDEX: 27.49 KG/M2 | HEIGHT: 64 IN | WEIGHT: 161 LBS

## 2021-05-31 NOTE — TELEPHONE ENCOUNTER
RN cannot approve Refill Request    RN can NOT refill this medication med is not covered by policy/route to provider. Last office visit: 7/30/2019 Doreen Meeks PA-C Last Physical: Visit date not found Last MTM visit: Visit date not found Last visit same specialty: 7/30/2019 Doreen Meeks PA-C.  Next visit within 3 mo: Visit date not found  Next physical within 3 mo: Visit date not found      Doris Rider, [facility] Triage/Med Refill 8/1/2019    Requested Prescriptions   Pending Prescriptions Disp Refills     tiZANidine (ZANAFLEX) 4 MG tablet [Pharmacy Med Name: TIZANIDINE 4MG TABLETS] 30 tablet 0     Sig: TAKE 1 TABLET BY MOUTH EVERY NIGHT AT BEDTIME AS NEEDED       There is no refill protocol information for this order

## 2021-05-31 NOTE — PROGRESS NOTES
HPI:  Kirti Sinha is a 34 y.o. female who is seen for   Chief Complaint   Patient presents with     Thyroid Problem     reheck thyroid      Personal Problem     would like anxiety med for flying    Kirti Sinha seen for follow-up on thyroid disease.  She would also discuss treatment for anxiety.  Currently traveling more for her work in the last 10 days.  She also has a trip coming up.  She has an autistic child who is 2 years old.  When she is gone she has increased anxiety worrying about her  being able to care for this child.  Usually it takes both of them at home to be able to manage his behaviors.  She states they do have a family therapy center that they go to weekly for parents of back to stick children.  She does feel that her  has good skills to care for this child.  When she was on a plane for her trip, she got very anxious, had a panic attack and asked the stewardess to stop the plane so she can get off.  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  She does admit that she has had some generalized anxiety history but is always been able to keep it under control.  Review of systems: As in HPI and negative for depression, globus, dysphasia, weight loss.    No results found for: HGBA1C  Lab Results   Component Value Date    LDLCALC 145 (H) 10/04/2018    CREATININE 0.68 10/04/2018     Patient Active Problem List   Diagnosis     Graves' Disease     Postoperative Hypoparathyroidism     Postsurgical Hypothyroidism     Insomnia     Trigger finger     Polycystic ovary syndrome     Family History   Problem Relation Age of Onset     Diabetes type II Father      Fibroids Mother      Fibroids Maternal Aunt      Social History     Socioeconomic History     Marital status:      Spouse name: None     Number of children: 2     Years of education: None     Highest education level: None   Occupational History     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[employer]   Social Needs     Financial resource strain: None     Food insecurity:     Worry: None     Inability: None     Transportation needs:     Medical: None     Non-medical: None   Tobacco Use     Smoking status: Former Smoker     Last attempt to quit: 2012     Years since quittin.2     Smokeless tobacco: Never Used     Tobacco comment: former smoker   Substance and Sexual Activity     Alcohol use: Yes     Comment: 1-2 x monthly     Drug use: No     Sexual activity: Yes   Lifestyle     Physical activity:     Days per week: None     Minutes per session: None     Stress: None   Relationships     Social connections:     Talks on phone: None     Gets together: None     Attends Uatsdin service: None     Active member of club or organization: None     Attends meetings of clubs or organizations: None     Relationship status: None     Intimate partner violence:     Fear of current or ex partner: None     Emotionally abused: None     Physically abused: None     Forced sexual activity: None   Other Topics Concern     None   Social History Narrative     None     Past Surgical History:   Procedure Laterality Date     CHOLECYSTECTOMY       [place] REMOVAL GALLBLADDER      Description: Cholecystectomy;  Recorded: 2013;     THYROIDECTOMY  2009     TONSILLECTOMY  2015     TOTAL THYROIDECTOMY       Current Outpatient Medications on File Prior to Visit   Medication Sig Dispense Refill     levothyroxine (SYNTHROID, LEVOTHROID) 125 MCG tablet TAKE 1 TABLET BY MOUTH DAILY 90 tablet 1     SUMAtriptan (IMITREX) 100 MG tablet Take 100 mg by mouth every 2 (two) hours as needed for migraine.       No current facility-administered medications on file prior to visit.      Allergies   Allergen Reactions     Penicillins Hives     OB History    None       I have reviewed the patient's medical history in detail and updated the computerized patient record.  OBJECTIVE:  Wt Readings from Last 3 Encounters:   19  157 lb 1.6 oz (71.3 kg)   07/11/19 157 lb 1.6 oz (71.3 kg)   03/14/19 157 lb 9.6 oz (71.5 kg)     Temp Readings from Last 3 Encounters:   12/17/18 97.9  F (36.6  C) (Oral)   04/06/17 97.5  F (36.4  C) (Oral)   02/14/17 97.8  F (36.6  C) (Oral)     BP Readings from Last 3 Encounters:   07/30/19 128/81   07/11/19 110/68   03/14/19 118/70     Pulse Readings from Last 3 Encounters:   07/30/19 79   07/11/19 77   03/14/19 76     Body mass index is 27.39 kg/m .     Alert, cooperative, well-hydrated. Appears well.  Eyes: Pupils equal, round, reactive to light.  HEENT: Sclera white, nares patent, MMM, TM's pearly bilaterally  Lungs: Clear to auscultation. No retractions, no increased work of respiration, equal chest rise.   Heart: Regular rate and rhythm, no murmurs, clicks,   Gallops.  Extremities: no tenderness to palpation of gastrocnemius, bilaterally.  Skin: no increased warmth, edema, or erythema of lower legs bilaterally.    Labs:  No visits with results within 3 Month(s) from this visit.   Latest known visit with results is:   Office Visit on 12/17/2018   Component Date Value     TSH 12/17/2018 0.72      ASSESMENT/PLAN:  1. ROMMEL (generalized anxiety disorder)  busPIRone (BUSPAR) 5 MG tablet   2. Insomnia, unspecified type  zolpidem (AMBIEN) 10 mg tablet   3. Graves disease  Thyroid Stimulating Hormone (TSH)   Discussed treatment for anxiety, her rommel score indicates that she has had a chronic problem with this and would like to treat regularly for this concern, will start BuSpar up to 3 times daily, 10 mg.  Discussed how this medication works and the decreased risk for dizziness, sleepiness, dependence.  She appears comfortable with this plan, all questions were answered.  Follow-up prior to next trip if it does not appear to be working and will bridge with a small amount of Ativan.  Doreen Meeks, MS, PA-C 08/01/19

## 2021-05-31 NOTE — TELEPHONE ENCOUNTER
Called and LVM for pt that a prescription was sent to the pharmacy and call back for more information    Please relay message from PCP

## 2021-06-01 NOTE — TELEPHONE ENCOUNTER
I can place an order for pulmonary function testing and have her return for discussion after, make follow up appointment in 2 weeks.

## 2021-06-01 NOTE — TELEPHONE ENCOUNTER
Pt states she gets migraines when she is around chemical smells or fragrances.   Pt states she moved into a new building with her job.  A lot of dust and construction. Felt a burning in her lungs and left work.   Went back to work yesterday and had a migraine and was having trouble breathing.  Feeling in her chest area where she can't breathe normally.   Having some chest pain and shortness of breath. States she has to speak in phrases.     Reason for Disposition    Difficulty breathing    MODERATE difficulty breathing (e.g., speaks in phrases, SOB even at rest, pulse 100-120) of new onset or worse than normal    Protocols used: CHEST PAIN-A-OH, BREATHING DIFFICULTY-A-OH    Pt advised to go to ED based on protocol.   Pt agreed and stated understanding to recommendations/plan.   Jessica Aden, RN   [facility] RN Triage

## 2021-06-01 NOTE — TELEPHONE ENCOUNTER
RN cannot approve Refill Request    RN can NOT refill this medication med is not covered by policy/route to provider     . Last office visit: 7/30/2019 Doreen Meeks PA-C Last Physical: Visit date not found Last MTM visit: Visit date not found Last visit same specialty: 7/30/2019 Doreen Meeks PA-C.  Next visit within 3 mo: Visit date not found  Next physical within 3 mo: Visit date not found      Jelly Schwartz, [facility] Triage/Med Refill 9/17/2019    Requested Prescriptions   Pending Prescriptions Disp Refills     tiZANidine (ZANAFLEX) 4 MG tablet [Pharmacy Med Name: TIZANIDINE 4MG TABLETS] 30 tablet 0     Sig: TAKE 1 TABLET BY MOUTH EVERY NIGHT AT BEDTIME AS NEEDED       There is no refill protocol information for this order

## 2021-06-02 VITALS — BODY MASS INDEX: 27.49 KG/M2 | HEIGHT: 64 IN | WEIGHT: 161 LBS

## 2021-06-02 VITALS — BODY MASS INDEX: 27.81 KG/M2 | WEIGHT: 159.5 LBS

## 2021-06-02 VITALS — BODY MASS INDEX: 28.11 KG/M2 | WEIGHT: 161.19 LBS

## 2021-06-02 VITALS — BODY MASS INDEX: 27.32 KG/M2 | WEIGHT: 156.7 LBS

## 2021-06-02 VITALS — WEIGHT: 157.6 LBS | BODY MASS INDEX: 27.48 KG/M2

## 2021-06-02 VITALS — WEIGHT: 161.06 LBS | BODY MASS INDEX: 28.08 KG/M2

## 2021-06-02 NOTE — PROGRESS NOTES
Pulmonary functions are normal, no evidence of asthma or COPD, please call results to the patient or send a letter if not reachable by phone.

## 2021-06-02 NOTE — PROGRESS NOTES
Mild anemia, multivitamin with iron recommended daily, please call results to the patient or send a letter if not reachable by phone.

## 2021-06-02 NOTE — PROGRESS NOTES
RESPIRATORY CARE NOTE     Patient Name: Kirti Sinha  Today's Date: 10/8/2019     Complete PFT done. Pt performed tests with good effort. Test results meet ATS standards for repeatability and acceptability.  HGB drawn. Results scanned into epic. Pt left in no distress.       Riddhi Blount, DIONISIOT

## 2021-06-03 VITALS
OXYGEN SATURATION: 97 % | SYSTOLIC BLOOD PRESSURE: 105 MMHG | BODY MASS INDEX: 27.4 KG/M2 | WEIGHT: 157.13 LBS | HEART RATE: 81 BPM | RESPIRATION RATE: 20 BRPM | TEMPERATURE: 98.2 F | DIASTOLIC BLOOD PRESSURE: 65 MMHG

## 2021-06-03 VITALS — BODY MASS INDEX: 27.39 KG/M2 | WEIGHT: 157.1 LBS

## 2021-06-03 VITALS — BODY MASS INDEX: 26.82 KG/M2 | HEIGHT: 64 IN | WEIGHT: 157.1 LBS

## 2021-06-03 NOTE — TELEPHONE ENCOUNTER
Refill Approved    Rx renewed per Medication Renewal Policy. Medication was last renewed on 5/9/19    Marlena Sánchez, [facility] Triage/Med Refill 11/8/2019     Requested Prescriptions   Pending Prescriptions Disp Refills     zolpidem (AMBIEN) 10 mg tablet [Pharmacy Med Name: ZOLPIDEM 10MG TABLETS] 30 tablet 0     Sig: TAKE 1 TABLET BY MOUTH AT BEDTIME AS NEEDED FOR SLEEP       Controlled Substances Refill Protocol Failed - 11/8/2019  9:41 PM        Failed - Route all Controlled Substance Requests to Provider        Failed - Patient has controlled substance agreement in past 12 months     Encounter-Level CSA Scan Date:    There are no encounter-level csa scan date.               Passed - Visit with PCP or prescribing provider visit in past 12 months      Last office visit with prescriber/PCP: 7/30/2019 Doreen Meeks PA-C OR same dept: 7/30/2019 Doreen Meeks PA-C OR same specialty: 7/30/2019 Doreen Meeks PA-C Last physical: Visit date not found Last MTM visit: Visit date not found    Next visit within 3 mo: Visit date not found  Next physical within 3 mo: Visit date not found  Prescriber OR PCP: Doreen Meeks PA-C  Last diagnosis associated with med order: 1. Insomnia, unspecified type  - zolpidem (AMBIEN) 10 mg tablet [Pharmacy Med Name: ZOLPIDEM 10MG TABLETS]; TAKE 1 TABLET BY MOUTH AT BEDTIME AS NEEDED FOR SLEEP  Dispense: 30 tablet; Refill: 0    2. Postoperative hypothyroidism  - levothyroxine (SYNTHROID, LEVOTHROID) 125 MCG tablet [Pharmacy Med Name: LEVOTHYROXINE 0.125MG (125MCG) TAB]; TAKE 1 TABLET BY MOUTH DAILY  Dispense: 90 tablet; Refill: 0               levothyroxine (SYNTHROID, LEVOTHROID) 125 MCG tablet [Pharmacy Med Name: LEVOTHYROXINE 0.125MG (125MCG) TAB] 90 tablet 0     Sig: TAKE 1 TABLET BY MOUTH DAILY       Thyroid Hormones Protocol Passed - 11/8/2019  9:41 PM        Passed - Provider visit in past 12 months or next 3 months     Last office visit with  prescriber/PCP: 7/30/2019 Doreen Meeks PA-C OR same dept: 7/30/2019 Doreen Meeks PA-C OR same specialty: 7/30/2019 Doreen Meeks PA-C  Last physical: Visit date not found Last MTM visit: Visit date not found   Next visit within 3 mo: Visit date not found  Next physical within 3 mo: Visit date not found  Prescriber OR PCP: Doreen Meeks PA-C  Last diagnosis associated with med order: 1. Insomnia, unspecified type  - zolpidem (AMBIEN) 10 mg tablet [Pharmacy Med Name: ZOLPIDEM 10MG TABLETS]; TAKE 1 TABLET BY MOUTH AT BEDTIME AS NEEDED FOR SLEEP  Dispense: 30 tablet; Refill: 0    2. Postoperative hypothyroidism  - levothyroxine (SYNTHROID, LEVOTHROID) 125 MCG tablet [Pharmacy Med Name: LEVOTHYROXINE 0.125MG (125MCG) TAB]; TAKE 1 TABLET BY MOUTH DAILY  Dispense: 90 tablet; Refill: 0    If protocol passes may refill for 12 months if within 3 months of last provider visit (or a total of 15 months).             Passed - TSH on file in past 12 months for patient age 12 & older     TSH   Date Value Ref Range Status   07/30/2019 0.48 0.30 - 5.00 uIU/mL Final

## 2021-06-03 NOTE — TELEPHONE ENCOUNTER
Controlled Substance Refill Request  Medication:   Requested Prescriptions     Pending Prescriptions Disp Refills     zolpidem (AMBIEN) 10 mg tablet [Pharmacy Med Name: ZOLPIDEM 10MG TABLETS] 30 tablet 0     Sig: TAKE 1 TABLET BY MOUTH AT BEDTIME AS NEEDED FOR SLEEP     Signed Prescriptions Disp Refills     levothyroxine (SYNTHROID, LEVOTHROID) 125 MCG tablet 90 tablet 1     Sig: Take 1 tablet (125 mcg total) by mouth daily.     Authorizing Provider: SHELLY MEEKS     Ordering User: INDIANA SKINNER     Date Last Fill: 7/30/19 # 30 3 RF  Pharmacy: [facility], [address], [place]   Submit electronically to pharmacy  Controlled Substance Agreement on File:   Encounter-Level CSA Scan Date:    There are no encounter-level csa scan date.       Last office visit: 7/30/2019 Shelly Meeks PA-C

## 2021-06-03 NOTE — TELEPHONE ENCOUNTER
Last Office Visit  7/30/2019 Doreen Meeks PA-C  Notes:    Kirti Sinha seen for follow-up on thyroid disease.  She would also discuss treatment for anxiety.  Currently traveling more for her work in the last 10 days.  She also has a trip coming up.  She has an autistic child who is 2 years old.  When she is gone she has increased anxiety worrying about her  being able to care for this child.  Usually it takes both of them at home to be able to manage his behaviors.  She states they do have a family therapy center that they go to weekly for parents of back to stick children.  She does feel that her  has good skills to care for this child.  When she was on a plane for her trip, she got very anxious, had a panic attack and asked the stewardess to stop the plane so she can get off.  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  She does admit that she has had some generalized anxiety history but is always been able to keep it under control.  Review of systems: As in HPI and negative for depression, globus, dysphasia, weight loss.  Last Filled:    zolpidem (AMBIEN) 10 mg tablet 30 tablet 3 7/30/2019  No   Sig - Route: Take 1 tablet (10 mg total) by mouth at bedtime as needed for sleep. - Oral   Sent to pharmacy as: zolpidem (AMBIEN) 10 mg tablet   E-Prescribing Status: Receipt confirmed by pharmacy (7/30/2019  8:49 AM CDT)       Next OV:  Visit date not found        Medication teed up for provider signature

## 2021-06-04 VITALS
RESPIRATION RATE: 16 BRPM | WEIGHT: 152 LBS | SYSTOLIC BLOOD PRESSURE: 106 MMHG | HEIGHT: 64 IN | BODY MASS INDEX: 25.95 KG/M2 | DIASTOLIC BLOOD PRESSURE: 67 MMHG | TEMPERATURE: 97.7 F | HEART RATE: 88 BPM

## 2021-06-04 VITALS
HEIGHT: 64 IN | DIASTOLIC BLOOD PRESSURE: 76 MMHG | WEIGHT: 152.9 LBS | HEART RATE: 73 BPM | TEMPERATURE: 98 F | RESPIRATION RATE: 16 BRPM | SYSTOLIC BLOOD PRESSURE: 118 MMHG | BODY MASS INDEX: 26.1 KG/M2

## 2021-06-04 VITALS
SYSTOLIC BLOOD PRESSURE: 106 MMHG | HEART RATE: 83 BPM | OXYGEN SATURATION: 97 % | DIASTOLIC BLOOD PRESSURE: 71 MMHG | TEMPERATURE: 98 F | WEIGHT: 152 LBS | RESPIRATION RATE: 12 BRPM | BODY MASS INDEX: 26.5 KG/M2

## 2021-06-04 VITALS — BODY MASS INDEX: 25.88 KG/M2 | HEIGHT: 64 IN | WEIGHT: 151.56 LBS

## 2021-06-04 NOTE — PROGRESS NOTES
Walk In Care Note                                                        Date of Visit: 12/9/2019     Chief Complaint   Kirti Sinha is a(n) 34 y.o.  or  female who presents to [facility] with the following complaint(s):  Nasal Congestion (ongoing congestion, headache, sore throat from draining, deep cough with alot of mucus, teeth pains, some diarrhea- daughter has strep )       Assessment and Plan   1. Acute non-recurrent pansinusitis  - cefdinir (OMNICEF) 300 MG capsule; Take 1 capsule (300 mg total) by mouth 2 (two) times a day for 10 days. Take with food. Take probiotic while on antibiotic.  Dispense: 20 capsule; Refill: 0    2. Sore throat  - Rapid Strep A Screen-Throat  - Group A Strep, RNA Direct Detection, Throat      Treating sinusitis with cefdinir as listed above due to penicillin allergy. Strep screen is negative. Reflex strep testing is in process; will be covered by cefdinir if positive.     Counseled patient regarding assessment and plan for evaluation and treatment. Questions were answered. See AVS for the specific written instructions and educational handout(s) regarding sinusitis that were provided at the conclusion of the visit.     Discussed signs / symptoms that warrant urgent / emergent medical attention.     Follow up as needed.      History of Present Illness   Primary symptom: Flu / Cold / Cough  Onset: 6 day(s) ago  Progression: Persisting  Fevers: Yes, Tmax 101 F  Chills: Yes  Sore throat: Yes  Nasal congestion: Yes  Rhinorrhea: Yes, green  Ear pain: Bilateral  Headache: Yes, with sinus pain / pressure and aching in the upper teeth.   Body aches: No  Cough: Yes, mainly at night  Shortness of breath: Yes  Sputum production: Yes, yellow  GI symptoms: Loose stools. Is nauseated at times. No vomiting.   Additional symptoms: None  Home therapies utilized: Tylenol and Aleve. Took a cough syrup yesterday evening.   Underlying lung disease: No  Exposure  to influenza: No  Exposure to strep: Daughter was diagnosed with strep a week ago.   Other ill contacts: No     Review of Systems   Review of Systems   All other systems reviewed and are negative.       Physical Exam   Vitals:    19 0853   BP: 106/71   Pulse: 83   Resp: 12   Temp: 98  F (36.7  C)   TempSrc: Oral   SpO2: 97%   Weight: 152 lb (68.9 kg)     Physical Exam  Vitals signs and nursing note reviewed.   Constitutional:       General: She is not in acute distress.     Appearance: She is well-developed and normal weight. She is ill-appearing. She is not toxic-appearing.   HENT:      Head: Normocephalic and atraumatic.      Right Ear: Tympanic membrane, ear canal and external ear normal.      Left Ear: Tympanic membrane, ear canal and external ear normal. There is impacted cerumen (partial).      Nose: Mucosal edema present. No rhinorrhea.      Right Sinus: No maxillary sinus tenderness or frontal sinus tenderness.      Left Sinus: No maxillary sinus tenderness or frontal sinus tenderness.      Mouth/Throat:      Mouth: Mucous membranes are moist. No oral lesions.      Pharynx: Uvula midline. Posterior oropharyngeal erythema present. No oropharyngeal exudate.      Tonsils: Swellin on the right. 0 on the left.   Eyes:      General: Lids are normal.      Conjunctiva/sclera: Conjunctivae normal.   Neck:      Musculoskeletal: Neck supple. No edema or erythema.   Cardiovascular:      Rate and Rhythm: Normal rate and regular rhythm.      Heart sounds: S1 normal and S2 normal. No murmur. No friction rub. No gallop.    Pulmonary:      Effort: Pulmonary effort is normal.      Breath sounds: Normal breath sounds. No stridor. No wheezing, rhonchi or rales.   Lymphadenopathy:      Cervical: No cervical adenopathy.   Skin:     General: Skin is warm and dry.      Coloration: Skin is not pale.      Findings: No rash.   Neurological:      General: No focal deficit present.      Mental Status: She is alert and oriented  to person, place, and time.          Diagnostic Studies   Laboratory:  Results for orders placed or performed in visit on 12/09/19   Rapid Strep A Screen-Throat   Result Value Ref Range    Rapid Strep A Antigen No Group A Strep detected, presumptive negative No Group A Strep detected, presumptive negative     Radiology:  N/A  Electrocardiogram:  N/A     Procedure Note   N/A     Pertinent History   The following portions of the patient's history were reviewed and updated as appropriate: allergies, current medications, past family history, past medical history, past social history, past surgical history and problem list.    Patient has Graves' Disease; Postoperative Hypoparathyroidism; Postsurgical Hypothyroidism; Insomnia; Trigger finger; and Polycystic ovary syndrome on their problem list.    Patient has a past medical history of Graves' Disease, Polycystic ovary syndrome (9/22/2009), Postoperative Hypoparathyroidism, and Postsurgical hypothyroidism.    Patient has a past surgical history that includes pr removal gallbladder; Total thyroidectomy; Thyroidectomy (2009); Cholecystectomy (2014); and Tonsillectomy (2015).    Patient's family history includes Diabetes type II in her father; Fibroids in her maternal aunt and mother.    Patient reports that she quit smoking about 7 years ago. She has never used smokeless tobacco. She reports current alcohol use. She reports that she does not use drugs.     Portions of this note have been dictated using voice recognition software. Any grammatical or context distortions are unintentional and inherent to the software.     Roger Tomlin MD  [facility]

## 2021-06-04 NOTE — TELEPHONE ENCOUNTER
Controlled Substance Refill Request  Medication:   Requested Prescriptions     Pending Prescriptions Disp Refills     zolpidem (AMBIEN) 10 mg tablet [Pharmacy Med Name: ZOLPIDEM 10MG TABLETS] 30 tablet 0     Sig: TAKE 1 TABLET BY MOUTH AT BEDTIME AS NEEDED FOR SLEEP     Date Last Fill: 11/11/19  Pharmacy: [id number]   Submit electronically to pharmacy  Controlled Substance Agreement on File:   Encounter-Level CSA Scan Date - 12/17/2018:    Scan on 12/21/2018  3:21 PM       Last office visit: Last office visit pertaining to requested medication was 7/30/19.

## 2021-06-04 NOTE — TELEPHONE ENCOUNTER
Last Office Visit  7/30/2019 Doreen Meeks PA-C  Notes:  Kirti Sinha seen for follow-up on thyroid disease.  She would also discuss treatment for anxiety.  Currently traveling more for her work in the last 10 days.  She also has a trip coming up.  She has an autistic child who is 2 years old.  When she is gone she has increased anxiety worrying about her  being able to care for this child.  Usually it takes both of them at home to be able to manage his behaviors.  She states they do have a family therapy center that they go to weekly for parents of back to stick children.  She does feel that her  has good skills to care for this child.  When she was on a plane for her trip, she got very anxious, had a panic attack and asked the stewardess to stop the plane so she can get off.  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  She does admit that she has had some generalized anxiety history but is always been able to keep it under control.  Review of systems: As in HPI and negative for depression, globus, dysphasia, weight loss.    Last Filled:    zolpidem (AMBIEN) 10 mg tablet 30 tablet 0 11/11/2019  No   Sig: TAKE 1 TABLET BY MOUTH AT BEDTIME AS NEEDED FOR SLEEP   Sent to pharmacy as: zolpidem 10 mg tablet (AMBIEN)   E-Prescribing Status: Receipt confirmed by pharmacy (11/11/2019  6:57 AM CST)     ASSESMENT/PLAN:  1. CYNTHIA (generalized anxiety disorder)  busPIRone (BUSPAR) 5 MG tablet   2. Insomnia, unspecified type  zolpidem (AMBIEN) 10 mg tablet   3. Graves disease  Thyroid Stimulating Hormone (TSH)   Discussed treatment for anxiety, her cynthia score indicates that she has had a chronic problem with this and would like to treat regularly for this concern, will start BuSpar up to 3 times daily, 10 mg.  Discussed how this medication works and the decreased risk for dizziness, sleepiness, dependence.  She appears comfortable with this plan,  all questions were answered.  Follow-up prior to next trip if it does not appear to be working and will bridge with a small amount of Ativan.  Doreen Meeks, MS, PA-C 08/01/19      Next OV:  Requested to follow up in 3 months nothing scheduled         Medication teed up for provider signature

## 2021-06-05 VITALS — WEIGHT: 150.3 LBS | HEIGHT: 63 IN | BODY MASS INDEX: 26.63 KG/M2

## 2021-06-05 NOTE — TELEPHONE ENCOUNTER
----- Message from Doreen Meeks PA-C sent at 1/10/2020 11:40 AM CST -----  Labs are normal, with exception of Vitamin D level, med sent to pharmacy. please call results to the patient or send a letter if not reachable by phone.

## 2021-06-05 NOTE — PROGRESS NOTES
Labs are normal, with exception of Vitamin D level, med sent to pharmacy. please call results to the patient or send a letter if not reachable by phone.

## 2021-06-05 NOTE — TELEPHONE ENCOUNTER
----- Message from Doreen Meeks PA-C sent at 1/10/2020  6:47 AM CST -----  Labs are normal, please call results to the patient or send a letter if not reachable by phone.

## 2021-06-05 NOTE — TELEPHONE ENCOUNTER
RN cannot approve Refill Request    RN can NOT refill this medication med is not covered by policy/route to provider. Last office visit: 7/30/2019 Doreen Meeks PA-C Last Physical: Visit date not found Last MTM visit: Visit date not found Last visit same specialty: 7/30/2019 Doreen Meeks PA-C.  Next visit within 3 mo: Visit date not found  Next physical within 3 mo: Visit date not found      Nighat Hughes, [facility] Triage/Med Refill 2/1/2020    Requested Prescriptions   Pending Prescriptions Disp Refills     tiZANidine (ZANAFLEX) 4 MG tablet [Pharmacy Med Name: TIZANIDINE 4MG TABLETS] 30 tablet 3     Sig: TAKE 1 TABLET BY MOUTH AT BEDTIME AS NEEDED       There is no refill protocol information for this order

## 2021-06-06 ENCOUNTER — HEALTH MAINTENANCE LETTER (OUTPATIENT)
Age: 36
End: 2021-06-06

## 2021-06-07 NOTE — PROGRESS NOTES
"Kirti Sinha is a 35 y.o. female who is being evaluated via a billable video visit.      The patient has been notified of following:     \"This video visit will be conducted via a call between you and your physician/provider. We have found that certain health care needs can be provided without the need for an in-person physical exam.  This service lets us provide the care you need with a video conversation.  If a prescription is necessary we can send it directly to your pharmacy.  If lab work is needed we can place an order for that and you can then stop by our lab to have the test done at a later time.    Video visits are billed at different rates depending on your insurance coverage. Please reach out to your insurance provider with any questions.    If during the course of the call the physician/provider feels a video visit is not appropriate, you will not be charged for this service.\"    Patient has given verbal consent to a Video visit? Yes    Patient would like to receive their AVS by AVS Preference: Christi.    Patient would like the video invitation sent by: Send to e-mail at: [email or web address]    Will anyone else be joining your video visit? No        Video Start Time: 9:08 AM    Additional provider notes:   She is requesting a refill on Ambien for insomnia which has worked in helping her falling and maintaining asleep. She's been on this medication for years and has tried Trazodone which gave her terrible headaches, Melatonin and Advil PM without much help.     In addition, she is having trigger finger of the left hand for some time with progressive worsening wondering what to do about it.     1. Insomnia, unspecified type  Treatment options reviewed, side effects and habit forming effect of the medication was once again reviewed.  Patient is open to trying something different, but prefers to have that conversation with her PCP at some point when some comes in.     Plan:   Refill:   - " zolpidem (AMBIEN) 10 mg tablet; Take 1 tablet (10 mg total) by mouth at bedtime as needed for sleep.  Dispense: 30 tablet; Refill: 0    2. Trigger finger, acquired  Pathophysiology and treatment options such as hand therapy and trigger point injection were reviewed.  Questions were answered to her satisfaction and plans to follow up in office for further management in the near future.        Video-Visit Details    Type of service:  Video Visit    Video End Time (time video stopped): 9:20 AM  Originating Location (pt. Location): Home    Distant Location (provider location):  [facility]     Mode of Communication:  Video Conference via AmericanFawad Siegel MD

## 2021-06-07 NOTE — TELEPHONE ENCOUNTER
Controlled Substance Refill Request  Medication Name:   Requested Prescriptions     Pending Prescriptions Disp Refills     zolpidem (AMBIEN) 10 mg tablet [Pharmacy Med Name: ZOLPIDEM 10MG TABLETS] 30 tablet 0     Sig: TAKE 1 TABLET BY MOUTH AT BEDTIME AS NEEDED FOR SLEEP     Date Last Fill: 12/9/19  Requested [facility]: [facility]  Submit electronically to pharmacy  Controlled Substance Agreement on file:   Encounter-Level CSA Scan Date - 12/17/2018:    Scan on 12/21/2018  3:21 PM        Last office visit:  7/30/19

## 2021-06-07 NOTE — TELEPHONE ENCOUNTER
Last Office Visit  7/30/2019 Doreen Meeks PA-C    Notes:    Thyroid Problem       reheck thyroid      Personal Problem       would like anxiety med for flying    Kirti Sinha seen for follow-up on thyroid disease.  She would also discuss treatment for anxiety.  Currently traveling more for her work in the last 10 days.  She also has a trip coming up.  She has an autistic child who is 2 years old.  When she is gone she has increased anxiety worrying about her  being able to care for this child.  Usually it takes both of them at home to be able to manage his behaviors.  She states they do have a family therapy center that they go to weekly for parents of back to stick children.  She does feel that her  has good skills to care for this child.  When she was on a plane for her trip, she got very anxious, had a panic attack and asked the stewardess to stop the plane so she can get off.  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  She does admit that she has had some generalized anxiety history but is always been able to keep it under control.      Last Filled:  zolpidem (AMBIEN) 10 mg tablet  30 tablet  3  12/9/2019   No    Sig - Route: Take 1 tablet (10 mg total) by mouth at bedtime as needed for sleep. - Oral    Sent to pharmacy as: zolpidem 10 mg tablet (AMBIEN)    E-Prescribing Status: Receipt confirmed by pharmacy (12/9/2019  1:48 PM CST)          No results found for: AMPHET, HEBENZODIAZ, OPIATES, PCP, THC, BARBIT, COCAINEMETAB, METHADNE, OXYCODONE, CREAUR      Next OV:  Visit date not found      Encounter-Level CSA Scan Date - 12/17/2018:    Scan on 12/21/2018  3:21 PM           A urine drug screen was performed on not performed   and is negative.       reviewed and results are as follows:    Unable to pull     Medication teed up for provider signature - please adjust as appropriate.

## 2021-06-07 NOTE — TELEPHONE ENCOUNTER
Patient Returning Call  Reason for call:  Patient returning call to check on the status of this request.  Information relayed to patient:  Pharmacy is requesting the amount to take daily please return call to them  Patient has additional questions:  No  If YES, what are your questions/concerns:  none  Okay to leave a detailed message?: Yes

## 2021-06-07 NOTE — TELEPHONE ENCOUNTER
Patient Returning Call  Reason for call: Patient returning call to check on the status of this request.  Information relayed to patient:  Pending providers review and approval.  Patient has additional questions:  yes  If YES, what are your questions/concerns:  Please fill ASAP as this was requested on 4/21/20 and the weekend is here.    Okay to leave a detailed message?: Yes

## 2021-06-07 NOTE — TELEPHONE ENCOUNTER
Controlled Substance Refill Request  Medication Name:   Requested Prescriptions     Pending Prescriptions Disp Refills     zolpidem (AMBIEN) 10 mg tablet 30 tablet 0     Sig: Take 1 tablet (10 mg total) by mouth at bedtime as needed for sleep.     Date Last Fill: 2/3/20  Is patient out of medication?: No, Seven days left  Patient notified refills processed within 3 business days:  Yes  Requested Pharmacy: [facility]  Submit electronically to pharmacy  Controlled Substance Agreement on file:   Encounter-Level CSA Scan Date - 12/17/2018:    Scan on 12/21/2018  3:21 PM        Last office visit: 9/17/19

## 2021-06-07 NOTE — TELEPHONE ENCOUNTER
RN cannot approve Refill Request    RN can NOT refill this medication med is not covered by policy/route to provider. Last office visit: 7/30/2019 Doreen Meeks PA-C Last Physical: Visit date not found Last MTM visit: Visit date not found Last visit same specialty: 7/30/2019 Doreen Meeks PA-C.  Next visit within 3 mo: Visit date not found  Next physical within 3 mo: Visit date not found      Divina Lucero, [facility] Triage/Med Refill 4/21/2020    Requested Prescriptions   Pending Prescriptions Disp Refills     tiZANidine (ZANAFLEX) 4 MG tablet 30 tablet 3     Sig: TAKE 1 TABLET BY MOUTH AT BEDTIME AS NEEDED       There is no refill protocol information for this order

## 2021-06-07 NOTE — TELEPHONE ENCOUNTER
Patient Returning Call  Reason for call:  Patient called back.  Information relayed to patient:  n/a  Patient has additional questions:  Yes         If YES, what are your   questions/concerns:  States would like this medication to go to [facility] in [place].      Please call when sent.    Please have covering provider address.    Okay to leave a detailed message?: Yes

## 2021-06-07 NOTE — PATIENT INSTRUCTIONS - HE
1. Insomnia, unspecified type  - zolpidem (AMBIEN) 10 mg tablet; Take 1 tablet (10 mg total) by mouth at bedtime as needed for sleep.  Dispense: 30 tablet; Refill: 0  Consider non controlled substance alternative.     2. Trigger finger, acquired  May consider Trigger point injection/ hand therapy.

## 2021-06-07 NOTE — TELEPHONE ENCOUNTER
Medication Request  Medication name: Ibuprofen 600 mg tablets  Requested Pharmacy: [facility]  Reason for request: Patient has a history of TMJ and having a lot of pain.  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  She can get by with ibuprofen but again, due to the pandemic, it is hard to find in any store right now.  Patient is asking for a prescription of Ibuprofen 600 mg to be sent to her pharmacy.   When did you use medication last?:  Today for an old supply  Patient offered appointment:  patient declined  Okay to leave a detailed message: yes

## 2021-06-07 NOTE — TELEPHONE ENCOUNTER
Called patient to clarify message. Patient just wanted to make sure medications were sent in. Let patient know that Tizanidine was sent in on 2/3/2020 as a 30 day supply with 3 refills so she has refills available and then the zolpidem was sent in as a 30 day supply. Patient appreciated the clarification and will call back when refills are needed

## 2021-06-07 NOTE — TELEPHONE ENCOUNTER
Controlled Substance Refill Request  Medication Name:   Requested Prescriptions     Pending Prescriptions Disp Refills     zolpidem (AMBIEN) 10 mg tablet [Pharmacy Med Name: ZOLPIDEM 10MG TABLETS] 30 tablet 0     Sig: TAKE 1 TABLET BY MOUTH EVERY NIGHT AT BEDTIME AS NEEDED FOR SLEEP     Date Last Fill: 3/26/20  [facility]  Submit electronically to pharmacy  Controlled Substance Agreement on file:   Encounter-Level CSA Scan Date - 12/17/2018:    Scan on 12/21/2018  3:21 PM        Last office visit:  7/30/19

## 2021-06-07 NOTE — TELEPHONE ENCOUNTER
Medication Question or Clarification  Who is calling: Patient  What medication are you calling about (include dose and sig)?:   zolpidem (AMBIEN) 10 mg tablet           Summary: TAKE 1 TABLET BY MOUTH AT BEDTIME AS NEEDED FOR SLEEP, Normal        tiZANidine (ZANAFLEX) 4 MG tablet           Summary: TAKE 1 TABLET BY MOUTH AT BEDTIME AS NEEDED, Normal        Who prescribed the medication?: Doreen Meeks PA-C  What is your question/concern?: Is the Ambien and tizanidine filled?  Writer shared yes the Ambien was filled yesterday for 30 tablet and refill should be left on tinzanidine.  Patient asked if the order was in place for three refill for 90 day supply?   Writer shared that is not noted in chart. Patient does not have mail order.  Please advise.    [facility]ed [facility]: [facility] to leave a detailed message?: Yes

## 2021-06-07 NOTE — TELEPHONE ENCOUNTER
Refill Approved    Rx renewed per Medication Renewal Policy. Medication was last renewed on 4/21/2020. This appears to be a duplicate request..    Doris Rider, [facility] Triage/Med Refill 4/21/2020     Requested Prescriptions   Pending Prescriptions Disp Refills     tiZANidine (ZANAFLEX) 4 MG tablet [Pharmacy Med Name: TIZANIDINE 4MG TABLETS] 30 tablet 3     Sig: TAKE 1 TABLET BY MOUTH EVERY NIGHT AT BEDTIME AS NEEDED       There is no refill protocol information for this order

## 2021-06-08 NOTE — PROGRESS NOTES
ASSESSMENT:   1. Sinusitis  fluticasone (FLONASE) 50 mcg/actuation nasal spray       PLAN:  Sinusitis  Continue Doxycycline  Flonase refilled  Recommend using a small amount of vaseline in your nostril as a moisture barrier  Push fluids, get extra rest  Recommend hot tea with lemon/honey  Recommend hot steamy showers, saline nasal spray or a netti pot to relieve congestion  May use a cough suppressant or a cool mist humidifier to lessen cough  Return to clinic if symptoms are not improving as expected or if worsening in any way.       SUBJECTIVE:   Kirti Sinha is a 32 y.o. female presents today with cold symptoms for 8 days. She was seen in [facility] 3 days ago and started on doxycycline for a sinusitis. She has been taking this and was feeling better 2 days ago but worse again yesterday. Thought she would feel better by now. She has had sinus congestion, rhinorrhea, PND, dry cough but denies myalgias, fever and chills. Sick contacts: family.     No past medical history on file.    History   Smoking Status     Former Smoker     Quit date: 4/30/2012   Smokeless Tobacco     Not on file     Comment: 15 cigs daily       Current Medications:  Current Outpatient Prescriptions   Medication Sig Dispense Refill     doxycycline (VIBRA-TABS) 100 MG tablet Take 1 tablet (100 mg total) by mouth 2 (two) times a day for 10 days. 20 tablet 0     fluticasone (FLONASE) 50 mcg/actuation nasal spray Use one spray into each nostril daily. 16 g 0     levothyroxine (SYNTHROID, LEVOTHROID) 137 MCG tablet Take 137 mcg by mouth daily.       loratadine 5 mg TbDL Take by mouth.       SUMAtriptan (IMITREX) 100 MG tablet Take 100 mg by mouth every 2 (two) hours as needed for migraine.       zolpidem (AMBIEN) 10 mg tablet Take 10 mg by mouth bedtime as needed for sleep.       No current facility-administered medications for this visit.        Allergies:   Allergies   Allergen Reactions     Penicillins Hives       OBJECTIVE:   Vitals:     02/14/17 1710   BP: 116/62   Pulse: 77   Resp: 20   Temp: 97.8  F (36.6  C)   TempSrc: Oral   SpO2: 96%   Weight: 168 lb 11.2 oz (76.5 kg)     Physical exam reveals a pleasant 32 y.o. female.   Appears alert and cooperative.  Eyes:  SARAHI, EOMI  Ears:  normal TMs bilaterally and normal canals bilaterally  Nose:    Mucosa normal. Scant, clear rhinorrhea.clear rhinorrhea and mucosal erythema  Mouth:  Mucosa pink and moist.  mild erythema   Neck: normal and supple, no adenopathy  Sinuses: nontender with palpation  Lungs: Chest is clear, no wheezing or rales. Symmetric air entry throughout both lung fields.  Heart: regular rate and rhythm, no murmur, rub or gallop

## 2021-06-08 NOTE — PROGRESS NOTES
Assessment/Plan:  Kirti was seen today for fatigue.    Diagnoses and all orders for this visit:    Acute maxillary sinusitis, recurrence not specified: The patient with symptoms of severe maxillary sinusitis from the initiation of this illness.  Given the severity and degree of fatigue, I am her recommending antibiotics.  Penicillin allergy.  Doxycycline twice daily ×10 days.  Follow-up with primary care provider as needed.  -     Rapid Strep A Screen-Throat  -     Group A Strep, RNA Direct Detection, Throat  -     doxycycline (VIBRA-TABS) 100 MG tablet; Take 1 tablet (100 mg total) by mouth 2 (two) times a day for 10 days.    Return if symptoms worsen or fail to improve.    Harris Saunders MD  _______________________________    Chief Complaint   Patient presents with     Fatigue     congestion and facial pain x 6 days.  Denies cough     Subjective: Kirti RodriguezllLynncharanjit is a 32 y.o. year old female who I have not seen in clinic before who presents with the following acute complaint(s):    Sinus concern:   - 6 days of symptoms   - was on a plane 7 days ago   - face hurts   - sleeping constantly for last 5 days   - has chills.  No measured fevers   - tylenol and ibuprofen.  Nyquil helps sleep.   - sore throat, runny nose   - has history of sinus infection - 5 years ago.    ROS: Complete review of systems obtained.  Pertinent items are listed above.     The following portions of the patient's history were reviewed and updated as appropriate: allergies, current medications, past medical history and problem list.    Objective:    weight is 168 lb 6.4 oz (76.4 kg). Her oral temperature is 97.6  F (36.4  C). Her blood pressure is 106/66 and her pulse is 82. Her respiration is 16 and oxygen saturation is 97%.   Gen.: No acute distress  HEENT: Tympanic membranes bilaterally are gray and glistening.  There is anterior cervical lymphadenopathy.  Additionally the posterior pharynx is markedly erythematous.  No obvious  tonsillar exudate (tonsils been surgically removed).    Cardiac: Regular rate and rhythm, normal S1/S2, no murmurs or gallops  Respiratory: Clear to auscultation bilaterally.    Recent Results (from the past 24 hour(s))   Rapid Strep A Screen-Throat   Result Value Ref Range    Rapid Strep A Antigen No Group A Strep detected No Group A Strep detected     No results found.    This note has been dictated using voice recognition software. Any grammatical or context distortions are unintentional and inherent to the software

## 2021-06-09 NOTE — TELEPHONE ENCOUNTER
I think it is ok to wait until then to be seen, everywhere is a little backed up and I don't think I could get her in anywhere else sooner.  They fibroid has likely been slowly growing for years so I don't think a few additional weeks will make much of a difference.

## 2021-06-09 NOTE — PROGRESS NOTES
Subjective:      Patient ID: Kirti Sinha is a 32 y.o. female.    Chief Complaint:    HPI  Pt is here complaining of severe L shoulder pain which started about 3 days ago and she can't remember any triggering factor. She mght have slept on it wrong. Denies any trauma, fall, or grabbing on something . She denies motor weakness , there's no tingling or numbness, she is right hand dominant. Pain in mainly in the front of the shoulder and she states she has problem lifting up things. She is able to hold on to things and there's no drops. Pt has taken motrin with minimal help and also has applied heat without help.      Past Surgical History:   Procedure Laterality Date     CA REMOVAL GALLBLADDER      Description: Cholecystectomy;  Recorded: 12/06/2013;     TOTAL THYROIDECTOMY         Family History   Problem Relation Age of Onset     Diabetes type II Father        Social History   Substance Use Topics     Smoking status: Former Smoker     Quit date: 4/30/2012     Smokeless tobacco: None      Comment: 15 cigs daily     Alcohol use Yes      Comment: 1-2 x monthly       Review of Systems   Constitutional: Negative for appetite change, chills and diaphoresis.   HENT: Negative.    Respiratory: Negative.    Cardiovascular: Negative.    Gastrointestinal: Negative.    Musculoskeletal: Positive for neck pain. Negative for arthralgias, back pain, gait problem, joint swelling, myalgias and neck stiffness.   Neurological: Negative for weakness and numbness.       Objective:     /76  Pulse 74  Temp 97.5  F (36.4  C) (Oral)   Resp 16  Wt 165 lb 1.6 oz (74.9 kg)  SpO2 99%  Breastfeeding? No  BMI 28.79 kg/m2    Physical Exam   Constitutional: She is oriented to person, place, and time. She appears well-developed and well-nourished. No distress.   Neck: Normal range of motion.   Musculoskeletal:        Left shoulder: She exhibits decreased range of motion, tenderness, pain and spasm. She exhibits no swelling,  no effusion, no crepitus and no deformity.   There's limited ROM due to pain, pt ahs normal motor 5/5 and sensation is normal as well. Pulses are present. Most tests were not performed due to pain. Neurovascular is intact in L arm, and shoulder    Neurological: She is alert and oriented to person, place, and time.   Skin: Skin is warm. She is not diaphoretic.       Assessment:     Procedures    Joint bursitis vs impingement vs rotator cuff injury --> conservative medical mgm with ice + motrin 800mg/tid for 10 days + flexeril    Plan:     Pt to take meds and if not better in 5 days, follow up with PCP for re-evaluation

## 2021-06-09 NOTE — PROGRESS NOTES
Assessment/ Plan     1. Pelvic pain in female  Kirti has had about a 6-month history of pelvic pain and heavy menses.  She has an enlarged uterus on exam, so I suspect she likely has fibroids.  She was due for Pap so that was sent today.  We will also do a GC, chlamydia, and wet prep.  As she has such heavy menses and a history of hypothyroidism, we will recheck her TSH.  We will also check a CBC and a ferritin to make sure she is not getting too low.  We discussed possible options depending on results including OCPs, IUD, uterine ablation, or possibly surgery if fibroids are large.  Further plan pending results.  - Gynecologic Cytology (PAP Smear)  - Chlamydia trachomatis & Neisseria gonorrhoeae, Amplified Detection  - Wet Prep, Vaginal  - US Pelvis With Transvaginal Non OB; Future  - HM2(CBC w/o Differential)  - Thyroid Stimulating Hormone (TSH)  - Ferritin    2. Postsurgical hypothyroidism  We will recheck a TSH today.  3.  Patient requesting serology for COVID.  She works for the [employer] and had been in [place] in January.  She is a febrile illness with cough.    Addendum: 7/8/2020: Patient had her pelvic ultrasound which revealed a very large fibroid 9.2 x 9.5 cm in size.  I talked to her and gave her the results.  [facility] for discussion of definitive treatment.  Subjective:       Kirti CanasKassieWeston is a 35 y.o. female who presents for discussion of pelvic pain.  Patient is new to me today.  She states that she tends to have really heavy menses.  The first couple of days she will have quite a bit of clotting and then by the third day she feels better.  She is been feeling really bloated and feels like her abdomen is big.  She states she tends to have like some PMS leading up to her menses and then after about day 3 she feels good for a week or so and then symptoms start over again.  She has noticed lately that she has been having some pain with intercourse, mainly deep  "pain, not vaginal or enteritis pain.  She not having any vaginal discharge or irritation.  She is .  She does have a history of PCOS and some infertility.  She states took a long time to get pregnant with her first child.  She is 2 children the youngest of which is 8.  Her  has had a vasectomy so she has not been on any birth control for quite some time.  Both her pregnancies and deliveries were normal.  Her weight has been stable.    Relevant past medical, family, surgical, and social history reviewed with patient, unless noted in HPI, not pertinent for this visit.  Medications were discussed and reconciled.   Review of Systems   A 12 point comprehensive review of systems was negative except as noted.      Current Outpatient Medications   Medication Sig Dispense Refill     levothyroxine (SYNTHROID, LEVOTHROID) 125 MCG tablet Take 1 tablet (125 mcg total) by mouth daily. 90 tablet 1     SUMAtriptan (IMITREX) 100 MG tablet Take 100 mg by mouth every 2 (two) hours as needed for migraine.       tiZANidine (ZANAFLEX) 4 MG tablet TAKE 1 TABLET BY MOUTH AT BEDTIME AS NEEDED 30 tablet 3     zolpidem (AMBIEN) 10 mg tablet Take 1 tablet (10 mg total) by mouth at bedtime as needed for sleep. 30 tablet 0     No current facility-administered medications for this visit.        Objective:      /67   Pulse 88   Temp 97.7  F (36.5  C)   Resp 16   Ht 5' 3.5\" (1.613 m)   Wt 152 lb (68.9 kg)   LMP 06/13/2020   BMI 26.50 kg/m        General appearance: alert, appears stated age and cooperative    Lungs: clear to auscultation bilaterally  Heart: regular rate and rhythm, S1, S2 normal, no murmur, click, rub or gallop   exam: Normal external genitalia, scant amount of normal discharge.  Cervix appears normal.  On bimanual exam, the uterus feels enlarged to just below the umbilicus.  No cervical motion tenderness.    Recent Results (from the past 168 hour(s))   HM2(CBC w/o Differential)   Result Value Ref Range "    WBC 9.3 4.0 - 11.0 thou/uL    RBC 3.91 3.80 - 5.40 mill/uL    Hemoglobin 12.1 12.0 - 16.0 g/dL    Hematocrit 35.4 35.0 - 47.0 %    MCV 90 80 - 100 fL    MCH 30.9 27.0 - 34.0 pg    MCHC 34.1 32.0 - 36.0 g/dL    RDW 12.1 11.0 - 14.5 %    Platelets 301 140 - 440 thou/uL    MPV 7.4 7.0 - 10.0 fL          This note has been dictated using voice recognition software. Any grammatical or context distortions are unintentional and inherent to the software

## 2021-06-09 NOTE — TELEPHONE ENCOUNTER
Pt calling  PCP [facility] pt to [facility] but she cannot get in until the 7/23  Had a Pelvis US on 7/7/2020  Pt started having hot flashes this morning, has had 2 ,lasting 5 mn's  Became lightheaded & diaphoretic  during the event  Pain level has stayed the same as discussed with Dr Boogie  Now feels fine  Pt spoke with Dr Boogie yesterday about results  Denies fever    Pt would like a response as to how she could be seen sooner then 7/23/2020    Routing to Dr Boogie Team    Jelly Mar RN  [place] Nurse Advisor    Reason for Disposition    Nursing judgment    Additional Information    Negative: Nursing judgment    Negative: Nursing judgment    Protocols used: INFORMATION ONLY CALL - NO TRIAGE-A-[place]

## 2021-06-09 NOTE — TELEPHONE ENCOUNTER
Pt called in states she has pain on her pelvic area.  The pain is not go anywhere.  The pain started 6 month ago and getting worse.  The pain started gradually.  The pain is constant.  The pain is 6-7/10 on the scale.  Nothing makes it  Worse.  No chest pain, no difficulty breathing,   No sweating or nausea.  No fever, no vomit or diarrhea.  Pt is not pregnant.  The disposition is to be seen with in the next week.  appointment is made for the Pt.  Care advice given per protocol.  Patient agrees with care advice given.   Agreed to call back if he has additional symptoms or questions.      Arturo Ray RN, [facility] Triage/Med Refill 7/6/2020 3:23 PM      Reason for Disposition    Abdominal pain is a chronic symptom (recurrent or ongoing AND lasting > 4 weeks)    Additional Information    Negative: Passed out (i.e., fainted, collapsed and was not responding)    Negative: Shock suspected (e.g., cold/pale/clammy skin, too weak to stand, low BP, rapid pulse)    Negative: Visible sweat on face or sweat is dripping down    Negative: Chest pain    Negative: SEVERE abdominal pain (e.g., excruciating)    Negative: Pain lasting > 10 minutes and over 50 years old    Negative: Pain lasting > 10 minutes and over 40 years old and associated chest, arm, neck, upper back, or jaw pain    Negative: Pain lasting > 10 minutes and over 35 years old and at least one cardiac risk factor    Negative: Pain lasting > 10 minutes and history of heart disease (i.e., heart attack, bypass surgery, angina, angioplasty, CHF)    Negative: Recent injury to the abdomen    Negative: Vomiting red blood or black (coffee ground) material    Negative: Bloody, black, or tarry bowel movements    Negative: Pregnant > 24 weeks and hand or face swelling    Negative: Constant abdominal pain lasting > 2 hours    Negative: Vomiting bile (green color)    Negative: Patient sounds very sick or weak to the triager    Negative: Vomiting and abdomen looks much  more swollen than usual    Negative: White of the eyes have turned yellow (i.e.,  jaundice)    Negative: Fever > 103 F (39.4 C)    Negative: Fever > 101 F (38.3 C) and over 60 years of age    Negative: Fever > 100.0 F (37.8 C) and has diabetes mellitus or a weak immune system (e.g., HIV positive, cancer chemotherapy, organ transplant, splenectomy, chronic steroids)    Negative: Fever > 100.0 F (37.8 C) and bedridden (e.g., nursing home patient, stroke, chronic illness, recovering from surgery)    Negative: Age > 60 years    Negative: Patient wants to be seen    Negative: MILD pain that comes and goes (cramps) lasts > 24 hours    Negative: Alcohol abuse known or suspected    Protocols used: ABDOMINAL PAIN - UPPER-A-[place]

## 2021-06-10 NOTE — PROGRESS NOTES
Preoperative Exam    Scheduled Procedure: hysterectomy  Surgery Date:  08/12/20  Surgery Location: [facility], fax [phone]    Surgeon:  Dr. Holloway    Assessment/Plan:     1. Uterine leiomyoma, unspecified location      2. Pre-op exam      Surgical Procedure Risk: Low (reported cardiac risk generally < 1%)  Have you had prior anesthesia?: Yes  Have you or any family members had a previous anesthesia reaction:  No  Do you or any family members have a history of a clotting or bleeding disorder?: No  Cardiac Risk Assessment: no increased risk for major cardiac complications    APPROVAL GIVEN to proceed with proposed procedure, without further diagnostic evaluation    Please Note:  no apnea    Functional Status: Independent  Patient plans to recover at home with family.     Subjective:      Kirti Sinha is a 35 y.o. female who presents for a preoperative consultation.      All other systems reviewed and are negative, other than those listed in the HPI.    Pertinent History  Do you have difficulty breathing or chest pain after walking up a flight of stairs: No  History of obstructive sleep apnea: No  Steroid use in the last 6 months: No  Frequent Aspirin/NSAID use: Yes: sometimes  Prior Blood Transfusion: No  Prior Blood Transfusion Reaction: No  If for some reason prior to, during or after the procedure, if it is medically indicated, would you be willing to have a blood transfusion?:  There is no transfusion refusal.    Current Outpatient Medications   Medication Sig Dispense Refill     levothyroxine (SYNTHROID, LEVOTHROID) 125 MCG tablet Take 1 tablet (125 mcg total) by mouth daily. 90 tablet 1     SUMAtriptan (IMITREX) 100 MG tablet Take 100 mg by mouth every 2 (two) hours as needed for migraine.       tiZANidine (ZANAFLEX) 4 MG tablet TAKE 1 TABLET BY MOUTH AT BEDTIME AS NEEDED 30 tablet 3     zolpidem (AMBIEN) 10 mg tablet Take 1 tablet (10 mg total) by mouth at bedtime as needed for sleep.  30 tablet 0     No current facility-administered medications for this visit.         Allergies   Allergen Reactions     Penicillins Hives       Patient Active Problem List   Diagnosis     Graves' Disease     Postoperative Hypoparathyroidism     Postsurgical Hypothyroidism     Insomnia     Trigger finger     Polycystic ovary syndrome       Past Medical History:   Diagnosis Date     Graves' Disease     Created by Conversion  Replacement Utility updated for latest IMO load     Polycystic ovary syndrome 2009     Postoperative Hypoparathyroidism     Created by Conversion      Postsurgical hypothyroidism     Created by Conversion        Past Surgical History:   Procedure Laterality Date     CHOLECYSTECTOMY       [place] REMOVAL GALLBLADDER      Description: Cholecystectomy;  Recorded: 2013;     THYROIDECTOMY  2009     TONSILLECTOMY  2015     TOTAL THYROIDECTOMY         Social History     Socioeconomic History     Marital status:      Spouse name: Not on file     Number of children: 2     Years of education: Not on file     Highest education level: Not on file   Occupational History     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[employer]   Social Needs     Financial resource strain: Not on file     Food insecurity     Worry: Not on file     Inability: Not on file     Transportation needs     Medical: Not on file     Non-medical: Not on file   Tobacco Use     Smoking status: Former Smoker     Last attempt to quit: 2012     Years since quittin.2     Smokeless tobacco: Never Used     Tobacco comment: former smoker   Substance and Sexual Activity     Alcohol use: Yes     Comment: 1-2 x monthly     Drug use: No     Sexual activity: Yes   Lifestyle     Physical activity     Days per week: Not on file     Minutes per session: Not on file     Stress: Not on file   Relationships     Social connections     Talks on phone: Not on file     Gets together: Not on file     Attends Buddhism service: Not on file     Active member  "of club or organization: Not on file     Attends meetings of clubs or organizations: Not on file     Relationship status: Not on file     Intimate partner violence     Fear of current or ex partner: Not on file     Emotionally abused: Not on file     Physically abused: Not on file     Forced sexual activity: Not on file   Other Topics Concern     Not on file   Social History Narrative     Not on file             Objective:     Vitals:    07/31/20 1045   BP: 118/76   Pulse: 73   Resp: 16   Temp: 98  F (36.7  C)   TempSrc: Tympanic   Weight: 152 lb 14.4 oz (69.4 kg)   Height: 5' 3.75\" (1.619 m)   LMP: 07/03/2020         Physical Exam:  Alert, cooperative, well-hydrated.  Appears well.  Eyes: Pupils equal, round, reactive to light.  HEENT: Sclera white, nares patent, MMM   Lungs: Clear to auscultation. No retractions, no increased work of respiration, equal chest rise.   Heart: Regular rate and rhythm, no murmurs, clicks,    Gallops.  Abdomen: Soft, bowel sounds in 4 quadrants with no tenderness to palpation, no organomegaly or masses, no aortic or renal bruits.  Extremities: no tenderness to palpation of gastrocnemius, bilaterally.  Skin: no increased warmth, edema, or erythema of lower legs bilaterally.  Back:  No cervical, thoracic or lumbar tenderness to spinous processes or musculature.      There are no Patient Instructions on file for this visit.      Labs:    Recent Results (from the past 240 hour(s))   HM2(CBC w/o Differential)   Result Value Ref Range    WBC 7.4 4.0 - 11.0 thou/uL    RBC 4.10 3.80 - 5.40 mill/uL    Hemoglobin 12.4 12.0 - 16.0 g/dL    Hematocrit 36.1 35.0 - 47.0 %    MCV 88 80 - 100 fL    MCH 30.4 27.0 - 34.0 pg    MCHC 34.5 32.0 - 36.0 g/dL    RDW 11.3 11.0 - 14.5 %    Platelets 270 140 - 440 thou/uL    MPV 7.4 7.0 - 10.0 fL     Immunization History   Administered Date(s) Administered     Hep B, Peds or Adolescent 07/31/2000     IPV 1985, 1985, 1985, 10/01/1984, 03/13/1986, " 10/12/1988     POLIO, Unspecified 1985, 1985, 1985, 10/01/1984, 03/13/1986, 10/12/1988     Tdap 1985, 1985, 1983, 10/01/1984, 03/13/1986, 08/10/1988, 01/31/2012           Electronically signed by Doreen Meeks PA-C 07/31/20 10:47 AM

## 2021-06-10 NOTE — ANESTHESIA POSTPROCEDURE EVALUATION
Patient: Kirti CanasKassieWeston  Procedure(s):  LAPAROSCOPIC ASSISTED VAGINAL HYSTERECTOMY, BILATERAL SAPINGECTOMY  Anesthesia type: general    Patient location: PACU  Last vitals:   Vitals Value Taken Time   /79 8/12/2020 12:45 PM   Temp 37.1  C (98.7  F) 8/12/2020 12:52 PM   Pulse 72 8/12/2020 12:54 PM   Resp 24 8/12/2020 12:54 PM   SpO2 96 % 8/12/2020 12:54 PM   Vitals shown include unvalidated device data.  Post vital signs: stable  Level of consciousness: awake and responds to simple questions  Post-anesthesia pain: pain controlled  Post-anesthesia nausea and vomiting: no  Pulmonary: unassisted  Cardiovascular: stable  Hydration: adequate  Anesthetic events: no    QCDR Measures:  ASA# 11 - Anai-op Cardiac Arrest: ASA11B - Patient did NOT experience unanticipated cardiac arrest  ASA# 12 - Anai-op Mortality Rate: ASA12B - Patient did NOT die  ASA# 13 - PACU Re-Intubation Rate: ASA13B - Patient did NOT require a new airway mgmt  ASA# 10 - Composite Anes Safety: ASA10A - No serious adverse event    Additional Notes:

## 2021-06-10 NOTE — ANESTHESIA CARE TRANSFER NOTE
Last vitals:   Vitals:    08/12/20 1142   BP: 130/81   Pulse: 82   Resp: 16   Temp: 37  C (98.6  F)   SpO2: 100%     Patient's level of consciousness is drowsy  Spontaneous respirations: yes  Maintains airway independently: yes  Dentition unchanged: yes  Oropharynx: oropharynx clear of all foreign objects    QCDR Measures:  ASA# 20 - Surgical Safety Checklist: WHO surgical safety checklist completed prior to induction    PQRS# 430 - Adult PONV Prevention: 4558F - Pt received => 2 anti-emetic agents (different classes) preop & intraop  ASA# 8 - Peds PONV Prevention: NA - Not pediatric patient, not GA or 2 or more risk factors NOT present  PQRS# 424 - Anai-op Temp Management: 4559F - At least one body temp DOCUMENTED => 35.5C or 95.9F within required timeframe  PQRS# 426 - PACU Transfer Protocol: - Transfer of care checklist used  ASA# 14 - Acute Post-op Pain: ASA14B - Patient did NOT experience pain >= 7 out of 10

## 2021-06-10 NOTE — TELEPHONE ENCOUNTER
zolpidem (AMBIEN) 10 mg tablet  30 tablet  0  4/29/2020   No    Sig - Route: Take 1 tablet (10 mg total) by mouth at bedtime as needed for sleep. - Oral    Sent to pharmacy as: zolpidem 10 mg tablet (AMBIEN)    E-Prescribing Status: Receipt confirmed by pharmacy (4/29/2020  9:20 AM CDT)      Pt was seen on 07/31/2020 for a Pre-op.      Medication was teed up for signature

## 2021-06-10 NOTE — ANESTHESIA PREPROCEDURE EVALUATION
Anesthesia Evaluation      Patient summary reviewed   History of anesthetic complications     Airway   Mallampati: II  Neck ROM: full   Pulmonary - negative ROS and normal exam                          Cardiovascular - negative ROS and normal exam   Neuro/Psych - negative ROS     Endo/Other    (+) hypothyroidism,   (-) hyperthyroidism     GI/Hepatic/Renal - negative ROS           Dental - normal exam                        Anesthesia Plan  Planned anesthetic: general endotracheal  Propofol gtt for PONV + scop + decadron/zofran  Consider 2 PIVs if needed    ASA 2   Induction: intravenous   Anesthetic plan and risks discussed with: patient  Anesthesia plan special considerations: antiemetics,   Post-op plan: routine recovery

## 2021-06-11 NOTE — ANESTHESIA POSTPROCEDURE EVALUATION
Patient: Kirti CanasKassieWeston  Procedure(s):  REPAIR, LACERATION, VAGINA  OPERATIVE LAPAROSCOPY, LYSIS OF ADHESIONS  Anesthesia type: general    Patient location: PACU  Last vitals:   Vitals Value Taken Time   /76 9/28/2020  8:30 PM   Temp 37.2  C (99  F) 9/28/2020  8:28 PM   Pulse 85 9/28/2020  8:41 PM   Resp 27 9/28/2020  8:41 PM   SpO2 96 % 9/28/2020  8:41 PM   Vitals shown include unvalidated device data.  Post vital signs: stable  Level of consciousness: awake and responds to simple questions  Post-anesthesia pain: pain controlled  Post-anesthesia nausea and vomiting: no  Pulmonary: unassisted, return to baseline  Cardiovascular: stable and blood pressure at baseline  Hydration: adequate  Anesthetic events: no    QCDR Measures:  ASA# 11 - Anai-op Cardiac Arrest: ASA11B - Patient did NOT experience unanticipated cardiac arrest  ASA# 12 - Anai-op Mortality Rate: ASA12B - Patient did NOT die  ASA# 13 - PACU Re-Intubation Rate: ASA13B - Patient did NOT require a new airway mgmt  ASA# 10 - Composite Anes Safety: ASA10A - No serious adverse event    Additional Notes:

## 2021-06-11 NOTE — ANESTHESIA CARE TRANSFER NOTE
Last vitals:   Vitals:    09/28/20 1539   BP: 124/67   Pulse: 80   Resp: 16   Temp: 36.6  C (97.9  F)   SpO2: 96%     Patient's level of consciousness is drowsy  Spontaneous respirations: yes  Maintains airway independently: yes  Dentition unchanged: yes  Oropharynx: oropharynx clear of all foreign objects    QCDR Measures:  ASA# 20 - Surgical Safety Checklist: WHO surgical safety checklist completed prior to induction    PQRS# 430 - Adult PONV Prevention: 4558F - Pt received => 2 anti-emetic agents (different classes) preop & intraop  ASA# 8 - Peds PONV Prevention: NA - Not pediatric patient, not GA or 2 or more risk factors NOT present  PQRS# 424 - Anai-op Temp Management: 4559F - At least one body temp DOCUMENTED => 35.5C or 95.9F within required timeframe  PQRS# 426 - PACU Transfer Protocol: - Transfer of care checklist used  ASA# 14 - Acute Post-op Pain: ASA14B - Patient did NOT experience pain >= 7 out of 10

## 2021-06-11 NOTE — ANESTHESIA PREPROCEDURE EVALUATION
Anesthesia Evaluation      Patient summary reviewed   History of anesthetic complications     Airway   Mallampati: II  Neck ROM: full   Pulmonary - negative ROS and normal exam                          Cardiovascular - negative ROS and normal exam   Neuro/Psych - negative ROS     Endo/Other    (+) hypothyroidism,   (-) hyperthyroidism     GI/Hepatic/Renal - negative ROS      Other findings: S/p vaginal hysterectomy on 8/12, now w/ vaginal cuff dehiscence, s/f repair      Dental - normal exam                          Anesthesia Plan  Planned anesthetic: general endotracheal  GETA - RSI w/ succinylcholine  Toradol 15mg at case closure if cleared with surgeon   Propofol gtt for PONV + scop + decadron/zofran      ASA 2   Induction: intravenous   Anesthetic plan and risks discussed with: patient  Anesthesia plan special considerations: antiemetics,   Post-op plan: routine recovery

## 2021-06-11 NOTE — TELEPHONE ENCOUNTER
"Kirti says she's  6 weeks post op from hysterectomy. Noting some abdo discomfort. Says had sex last night and noting a lot of abdominal pain. Also complaining of nausea, did vomit once. No vaginal discharge. Has not had a BM today but has been having regular normal bowel movements.     Not really pain in pelvic area, but instead pain around \"belly button\" to left and to right, tender on both sides but increased tenderness left. Has had some of this discomfort since surgery, but more so today. Today it hurts to walk, roll over, do anything. \"Just hurts to move, do anything.\"  She is also concerned as her temp is Temp 99.8 when she says she is always 97.      She plans to have her  bring her in.                   "

## 2021-06-11 NOTE — TELEPHONE ENCOUNTER
Susan Holloway MD. Patient had a vaginal cuff tear repair with free enteroperitoneal tear on stomach repaired as well. Is in need of more pain medications. She taking the pain med Oxycodone 5 mg, 2 every 5 -6 hours. She needs a refill today.  [facility] for [facility] medications closes at 5 p.m. today. Please call patient at [phone]. She will call back if she hasn't heard from you by 2 p.m. today.  Thank you,  Onelia Sarabia RN  [facility]    Reason for Disposition    Caller has URGENT medication question about med that PCP prescribed and triager unable to answer question    Additional Information    Negative: Drug overdose and triager unable to answer question    Negative: Caller requesting information unrelated to medicine    Negative: Caller requesting a prescription for Strep throat and has a positive culture result    Negative: Rash while taking a medication or within 3 days of stopping it    Negative: Immunization reaction suspected    Negative: Asthma and having symptoms of asthma (cough, wheezing, etc.)    Negative: Breastfeeding questions about mother's medicines and diet    Negative: MORE THAN A DOUBLE DOSE of a prescription or over-the-counter (OTC) drug    Negative: DOUBLE DOSE (an extra dose or lesser amount) of over-the-counter (OTC) drug and any symptoms (e.g., dizziness, nausea, pain, sleepiness)    Negative: DOUBLE DOSE (an extra dose or lesser amount) of prescription drug and any symptoms (e.g., dizziness, nausea, pain, sleepiness)    Negative: Took another person's prescription drug    Negative: DOUBLE DOSE (an extra dose or lesser amount) of prescription drug and NO symptoms (Exception: a double dose of antibiotics)    Negative: Diabetes drug error or overdose (e.g., took wrong type of insulin or took extra dose)    Negative: Caller has medication question about med not prescribed by PCP and triager unable to answer question (e.g., compatibility with other med,  storage)    Negative: Request for URGENT new prescription or refill of 'essential' medication (i.e., likelihood of harm to patient if not taken) and triager unable to fill per department policy    Negative: Prescription not at pharmacy and was prescribed today by PCP    Negative: Pharmacy calling with prescription questions and triager unable to answer question    Protocols used: MEDICATION QUESTION CALL-A-[place]

## 2021-06-12 NOTE — TELEPHONE ENCOUNTER
Patient's surgeon or provider: Dr. Barnhart    Caller: Kirti  Phone Number: [phone]  OK to leave message: Yes    Reason for Call: Pt is calling with questions about the REPAIR, LACERATION, VAGINA, OPERATIVE LAPAROSCOPY, LYSIS OF ADHESIONS that he assisted in on 9/29/2020.  She is questioning why he was consulted and why what he did was performed.   Please call the patient to discuss.

## 2021-06-12 NOTE — TELEPHONE ENCOUNTER
Patient calling - says she had surgery on Monday for vaginal cuff repair and laparoscopic surgery on her ovaries.  Says she ran out of pain meds last night.  Contacted her surgeon this morning.  The surgeon left a prescription for pain mediation at the .  Her  went to pick it up and fill it.  The pharmacist told her  there is problem with her DIANA number and they cannot fill.    Rates pain 3-4/10.  Soreness is constant.  Says pain is worse today than yesterday.  Is taking Ibupforen 800 mg  every 8 hour per instructions from her providers.  Taking 1000 mg of Tylenol every 6 hours per instructions from her provider.    Triaged to disposition of See Physician within 24 Hours.  Patient intends to try calling on-call surgeon first.  Advised to call back if fever, spreading redness or foul smelling drainage occurs.    Kitty Mosqueda RN  Triage Nurse Advisor    COVID 19 Nurse Triage Plan/Patient Instructions    Please be aware that novel coronavirus (COVID-19) may be circulating in the community. If you develop symptoms such as fever, cough, or SOB or if you have concerns about the presence of another infection including coronavirus (COVID-19), please contact your health care provider or visit [email or web address].     Disposition/Instructions    In-Person Visit with provider recommended. Reference Visit Selection Guide.    Thank you for taking steps to prevent the spread of this virus.  o Limit your contact with others.  o Wear a simple mask to cover your cough.  o Wash your hands well and often.    Resources    [facility]: About COVID-19: [email or web address]    CDC: What to Do If You're Sick: www.cdc.gov/coronavirus/2019-ncov/about/steps-when-sick.html    CDC: Ending Home Isolation: www.cdc.gov/coronavirus/2019-ncov/hcp/disposition-in-home-patients.html     CDC: Caring for Someone: www.cdc.gov/coronavirus/2019-ncov/if-you-are-sick/care-for-someone.html     [facility]: Interim Guidance for  Hospital Discharge to Home: www.health.[facility].[place]/diseases/coronavirus/hcp/hospdischarge.pdf    [facility] clinical trials (COVID-19 research studies): [email or web address].[email or web address]/umn-clinical-trials     Below are the COVID-19 hotlines at the [place] Department of Health ([facility]). Interpreters are available.   o For health questions: Call 047-136-6397 or 1-381.900.1176 (7 a.m. to 7 p.m.)  o For questions about schools and childcare: Call 016-622-7613 or 1-871.788.5896 (7 a.m. to 7 p.m.)       Reason for Disposition    [1] INCREASING pain in incision AND [2] > 2 days (48 hours) since surgery    Additional Information    Negative: [1] Major abdominal surgical incision AND [2] wound gaping open AND [3] visible internal organs    Negative: Sounds like a life-threatening emergency to the triager    Negative: [1] Bleeding from incision AND [2] won't stop after 10 minutes of direct pressure    Negative: [1] Widespread rash AND [2] bright red, sunburn-like    Negative: Severe pain in the incision    Negative: [1] Incision gaping open AND [2] < 48 hours since wound re-opened    Negative: [1] Incision gaping open AND [2] length of opening > 2 inches (5 cm)    Negative: Patient sounds very sick or weak to the triager    Negative: Sounds like a serious complication to the triager    Negative: Fever > 100.4 F (38.0 C)    Negative: [1] Incision looks infected (spreading redness, pain) AND [2] fever > 99.5 F (37.5 C)    Negative: [1] Incision looks infected (spreading redness, pain) AND [2] large red area (> 2 in. or 5 cm)    Negative: [1] Incision looks infected (spreading redness, pain) AND [2] face wound    Negative: [1] Red streak runs from the incision AND [2] longer than 1 inch (2.5 cm)    Negative: [1] Pus or bad-smelling fluid draining from incision AND [2] no fever    Negative: [1] Post-op pain AND [2] not controlled with pain medications    Negative: Dressing soaked with blood or body fluid (e.g., drainage)     Negative: [1] Raised bruise and [2] size > 2 inches (5 cm) and expanding    Negative: [1] Caller has URGENT question AND [2] triager unable to answer question    Protocols used: POST-OP INCISION SYMPTOMS-A-AH

## 2021-06-12 NOTE — TELEPHONE ENCOUNTER
Discussed operative report for lysis of adhesions performed as consulted by her OBGYN surgeon, Luis Miguel Holloway DO. A copy of the op report was sent to the pt per her request and she was advised to follow up with Dr. Holloway to discuss her additional questions pertaining to her vaginal surgery.

## 2021-06-13 NOTE — PROGRESS NOTES
"[facility] Clinic Note  Patient Name: Kirti Sinha  Patient Age: 32 y.o.  YOB: 1985  MRN: [id number]  ?  Date of Visit: 10/5/2017  Reason for Office Visit:   Chief Complaint   Patient presents with     STD screen     Has not been exposed. Would like a routine check. Does not want a pregnancy test.      HPI: Kirti Sinha 32 y.o. female who presents to clinic for STI screening. She is in the process of reconciling her relationship with her ex  and would like to be tested. Has been with different partners. No known exposure to a STI. She denies symptoms such as discharge, urination problems, lesions. No personal history of STIs. She denies IVDU.     She also has a history of post surgical hypothyroidism from graves. She is taking 137 mcg of synthroid. This dose seems to be fine for her. Her weight is stable, energy level, no GI issues.     Her most recent PAP was 1 yr ago and normal      Review of Systems: As noted in HPI     Current Scheduled Meds:  Outpatient Encounter Prescriptions as of 10/5/2017   Medication Sig Dispense Refill     fluticasone (FLONASE) 50 mcg/actuation nasal spray Use one spray into each nostril daily. 16 g 0     levothyroxine (SYNTHROID, LEVOTHROID) 137 MCG tablet Take 137 mcg by mouth daily.       SUMAtriptan (IMITREX) 100 MG tablet Take 100 mg by mouth every 2 (two) hours as needed for migraine.       tiZANidine (ZANAFLEX) 4 MG tablet Take 1 tablet by mouth as needed.  2     zolpidem (AMBIEN) 10 mg tablet Take 10 mg by mouth bedtime as needed for sleep.       [DISCONTINUED] loratadine 5 mg TbDL Take by mouth.       No facility-administered encounter medications on file as of 10/5/2017.        Objective / Physical Examination:  /90  Pulse 84  Ht 5' 3.5\" (1.613 m)  Wt 161 lb (73 kg)  BMI 28.07 kg/m2  Wt Readings from Last 3 Encounters:   10/05/17 161 lb (73 kg)   04/06/17 165 lb 1.6 oz (74.9 kg)   02/14/17 168 lb 11.2 oz (76.5 " kg)     Body mass index is 28.07 kg/(m^2). (>25?)    General Appearance: Alert and oriented in no acute distress.pleasnt interactive   Neck: post surgical horizontal scar, no Thyromegaly   deferred pelvic, declined trichomonas and wet prep testing.   No other exam done today  Assessment / Plan / Medical Decision Making:      Encounter Diagnoses   Name Primary?     Routine screening for STI (sexually transmitted infection) Yes     Postsurgical hypothyroidism         1. Routine screening for STI (sexually transmitted infection)    Will call with results and treat/manage based on results  Declined trich/BV testing.     - Chlamydia trachomatis & Neisseria gonorrhoeae, Amplified Detection  - HIV Antigen/Antibody Screening [place]  - Syphilis Screen, [place] ([facility])  - Hepatitis C Antibody (Anti-HCV)    2. Postsurgical hypothyroidism    Clinically appears euthyroid  We reviewed this. Due to graves, thyroid removed  Stable on 137 mcg synthroid.   Due for repeat TSH    - Thyroid Stimulating Hormone (TSH)    3. HCM  Pap done last year, not due till 2021    Follow up to establish care within a year or sooner if needed    Total time spent with patient was 15 minutes with >50% of time spent in face-to-face counseling regarding the above plan     Mj Taylor MD  [facility]

## 2021-06-16 PROBLEM — K66.8 FREE INTRAPERITONEAL AIR: Status: ACTIVE | Noted: 2020-09-27

## 2021-06-16 PROBLEM — D25.9 FIBROID UTERUS: Status: ACTIVE | Noted: 2020-07-31

## 2021-06-16 PROBLEM — T81.328A: Status: ACTIVE | Noted: 2020-09-27

## 2021-06-16 PROBLEM — M65.30 TRIGGER FINGER: Status: ACTIVE | Noted: 2018-06-01

## 2021-06-16 PROBLEM — D25.9 UTERINE FIBROID: Status: ACTIVE | Noted: 2020-08-12

## 2021-06-16 NOTE — PATIENT INSTRUCTIONS - HE
Dear Kirti Sinha,    Your symptoms show that you may have coronavirus (COVID-19). This illness can cause fever, cough and trouble breathing. Many people get a mild case and get better on their own. Some people can get very sick.    Will I be tested for COVID-19?  We would like to test you for Covid-19 virus. I have placed orders for this test.     To schedule: go to your [employer] home page and scroll down to the section that says  You have an appointment that needs to be scheduled  and click the large green button that says  Schedule Now  and follow the steps to find the next available openings.    If you are unable to complete these [employer] scheduling steps, please call [phone] to schedule your testing.     Return to work/school/ guidance:  Please let your workplace manager and staffing office know when your quarantine ends     We can t give you an exact date as it depends on the above. You can calculate this on your own or work with your manager/staffing office to calculate this. (For example if you were exposed on 10/4, you would have to quarantine for 14 full days. That would be through 10/18. You could return on 10/19.)      If you receive a positive COVID-19 test result, follow the guidance of the those who are giving you the results. Usually the return to work is 10 (or in some cases 20 days from symptom onset.) If you [facility] at [facility], you must also be cleared by Employee Occupational Health and Safety to return to work.        If you receive a negative COVID-19 test result and did not have a high risk exposure to someone with a known positive COVID-19 test, you can return to work once you're free of fever for 24 hours without fever-reducing medication and your symptoms are improving or resolved.      If you receive a negative COVID-19 test and If you had a high risk exposure to someone who has tested positive for COVID-19 then you can return to work 14 days after your  last contact with the positive individual    Note: If you have ongoing exposure to the covid positive person, this quarantine period may be more than 14 days. (For example, if you are continued to be exposed to your child who tested positive and cannot isolate from them, then the quarantine of 7-14 days can't start until your child is no longer contagious. This is typically 10 days from onset of the child's symptoms. So the total duration may be 17-24 days in this case.)    Sign up for [employer].   We know it's scary to hear that you might have COVID-19. We want to track your symptoms to make sure you're okay over the next 2 weeks. Please look for an email from [employer]--this is a free, online program that we'll use to keep in touch. To sign up, follow the link in the email you will receive. Learn more at [email or web address]    How can I take care of myself?    Get lots of rest. Drink extra fluids (unless a doctor has told you not to)    Take Tylenol (acetaminophen) or ibuprofen for fever or pain. If you have liver or kidney problems, ask your family doctor if it's okay to take Tylenol o ibuprofen    If you have other health problems (like cancer, heart failure, an organ transplant or severe kidney disease): Call your specialty clinic if you don't feel better in the next 2 days.    Know when to call 911. Emergency warning signs include:  o Trouble breathing or shortness of breath  o Pain or pressure in the chest that doesn't go away  o Feeling confused like you haven't felt before, or not being able to wake up  o Bluish-colored lips or face    Where can I get more information?  [facility] - About COVID-19:   [email or web address]    CDC - What to Do If You're Sick:   www.cdc.gov/coronavirus/2019-ncov/about/steps-when-sick.html

## 2021-06-16 NOTE — TELEPHONE ENCOUNTER
zolpidem (AMBIEN) 5 MG tablet [[id number]]    Electronically signed by: Doreen Meeks PA-C on 08/04/20 0805 Status: Discontinued   Ordering user: Doreen Meeks PA-C 08/04/20 0805 Authorized by: Doreen Meeks PA-C   PRN reasons: sleep   Frequency: Bedtime PRN 08/04/20 - 09/27/20  Released by: Doreen Meeks PA-C 08/04/20 0805   Discontinued by: Susanne Toure, PharmD 09/27/20 2044 [Therapy completed]   Diagnoses   Insomnia, unspecified type [G47.00]

## 2021-06-17 NOTE — PATIENT INSTRUCTIONS - HE
Patient Instructions by Heather Beck CNP at 9/17/2019  3:00 PM     Author: Heather Beck CNP Service: -- Author Type: Nurse Practitioner    Filed: 9/17/2019  3:35 PM Encounter Date: 9/17/2019 Status: Addendum    : Heather Beck CNP (Nurse Practitioner)    Related Notes: Original Note by Heather Beck CNP (Nurse Practitioner) filed at 9/17/2019  3:35 PM       Zyrtec once daily for allergy symptoms.      Pepcid twice daily for possible stomach acid symptoms for 14 days.    Take 1 of each pill today when you get home.    Follow-up if you are not feeling much better in about a week.    Work at home if possible.              Patient Education     Allergic Rhinitis  Allergic rhinitis is an allergic reaction that affects the nose, and often the eyes. Its often known as nasal allergies. Nasal allergies are often due to things in the environment that are breathed in. Depending what you are sensitive to, nasal allergies may occur only during certain seasons. Or they may occur year round. Common indoor allergens include house dust mites, mold, cockroaches, and pet dander. Outdoor allergens include pollen from trees, grasses, and weeds.   Symptoms include a drippy, stuffy, and itchy nose. They also include sneezing and red and itchy eyes. You may feel tired more often. Severe allergies may also affect your breathing and trigger a condition called asthma.   Tests can be done to see what allergens are affecting you. You may be referred to an allergy specialist for testing and further evaluation.  Home care  Your healthcare provider may prescribe medicines to help relieve allergy symptoms. These may include oral medicines, nasal sprays, or eye drops.  Ask your provider for advice on how to avoid substances that you are allergic to. Below are a few tips for each type of allergen.  Pet dander:    Do not have pets with fur and feathers.    If you can't avoid having a pet, keep it out of your bedroom and off  upholstered furniture.  Pollen:    When pollen counts are high, keep windows of your car and home closed. If possible, use an air conditioner instead.    Wear a filter mask when mowing or doing yard work.  House dust mites:    Wash bedding every week in warm water and detergent and dry on a hot setting.    Cover the mattress, box spring, and pillows with allergy covers.     If possible, sleep in a room with no carpet, curtains, or upholstered furniture.  Cockroaches:    Store food in sealed containers.    Remove garbage from the home promptly.    Fix water leaks  Mold:    Keep humidity low by using a dehumidifier or air conditioner. Keep the dehumidifier and air conditioner clean and free of mold.    Clean moldy areas with bleach and water.  In general:    Vacuum once or twice a week. If possible, use a vacuum with a high-efficiency particulate air (HEPA) filter.    Do not smoke. Avoid cigarette smoke. Cigarette smoke is an irritant that can make symptoms worse.  Follow-up care  Follow up as advised by the healthcare provider or our staff. If you were referred to an allergy specialist, make this appointment promptly.  When to seek medical advice  Call your healthcare provider right away if the following occur:    Coughing or wheezing    Fever of 100.4 F (38 C) or higher, or as directed by your healthcare provider    Raised red bumps (hives)    Continuing symptoms, new symptoms, or worsening symptoms  Call 911 if you have:    Trouble breathing    Severe swelling of the face or severe itching of the eyes or mouth  Date Last Reviewed: 3/1/2017    9621-4623 [employer]. [address] [place] [address], [place] [address]. All rights reserved. This information is not intended as a substitute for professional medical care. Always follow your healthcare professional's instructions.

## 2021-06-17 NOTE — PATIENT INSTRUCTIONS - HE
Patient Instructions by Roger Tomlin MD at 12/9/2019  8:50 AM     Author: Roger Tomlin MD Service: -- Author Type: Physician    Filed: 12/9/2019 10:16 AM Encounter Date: 12/9/2019 Status: Addendum    : Roger Tomlin MD (Physician)    Related Notes: Original Note by Roger Tomlin MD (Physician) filed at 12/9/2019 10:16 AM       -Take the full course of cefdinir as prescribed.  -Take a probiotic while taking this antibiotic.  This can be purchased over the counter at your local pharmacy.  --Rapid strep test is negative.  A confirmatory strep test is in process and will be finalized tomorrow.  -We will only reach out to you if the confirmatory strep test is positive.  -Recommend rest, hydration, and over the counter pain relievers as needed for fever and discomfort.  Patient Education     Sinusitis (Antibiotic Treatment)    The sinuses are air-filled spaces within the bones of the face. They connect to the inside of the nose. Sinusitis is an inflammation of the tissue that lines the sinuses. Sinusitis can occur during a cold. It can also happen due to allergies to pollens and other particles in the air. Sinusitis can cause symptoms of sinus congestion and a feeling of fullness. A sinus infection causes fever, headache, and facial pain. There is often green or yellow fluid draining from the nose or into the back of the throat (post-nasal drip). You have been given antibiotics to treat this condition.  Home care    Take the full course of antibiotics as instructed. Do not stop taking them, even when you feel better.    Drink plenty of water, hot tea, and other liquids. This may help thin nasal mucus. It also may help your sinuses drain fluids.    Heat may help soothe painful areas of your face. Use a towel soaked in hot water. Or,  the shower and direct the warm spray onto your face. Using a vaporizer along with a menthol rub at night may also help soothe  symptoms.     An expectorant with guaifenesin may help thin nasal mucus and help your sinuses drain fluids.    You can use an over-the-counter decongestant, unless a similar medicine was prescribed to you. Nasal sprays work the fastest. Use one that contains phenylephrine or oxymetazoline. First blow your nose gently. Then use the spray. Do not use these medicines more often than directed on the label. If you do, your symptoms may get worse. You may also take pills that contain pseudoephedrine. Dont use products that combine multiple medicines. This is because side effects may be increased. Read labels. You can also ask the pharmacist for help. (People with high blood pressure should not use decongestants. They can raise blood pressure.)    Over-the-counter antihistamines may help if allergies contributed to your sinusitis.      Do not use nasal rinses or irrigation during an acute sinus infection, unless your healthcare provider tells you to. Rinsing may spread the infection to other areas in your sinuses.    Use acetaminophen or ibuprofen to control pain, unless another pain medicine was prescribed to you. If you have chronic liver or kidney disease or ever had a stomach ulcer, talk with your healthcare provider before using these medicines. (Aspirin should never be taken by anyone under age 18 who is ill with a fever. It may cause severe liver damage.)    Don't smoke. This can make symptoms worse.  Follow-up care  Follow up with your healthcare provider or our staff if you are better in 1 week.  When to seek medical advice  Call your healthcare provider if any of these occur:    Facial pain or headache that gets worse    Stiff neck    Unusual drowsiness or confusion    Swelling of your forehead or eyelids    Vision problems, such as blurred or double vision    Fever of 100.4 F (38 C) or higher, or as directed by your healthcare provider    Seizure    Breathing problems    Symptoms don't go away in 10  days  Prevention  Here are steps you can take to help prevent an infection:    Keep good hand washing habits.    Dont have close contact with people who have sore throats, colds, or other upper respiratory infections.    Dont smoke, and stay away from secondhand smoke.    Stay up to date with of your vaccines.  Date Last Reviewed: 11/1/2017 2000-2017 [employer]. [address] [place], [place] [id number]. All rights reserved. This information is not intended as a substitute for professional medical care. Always follow your healthcare professional's instructions.

## 2021-06-19 NOTE — LETTER
Letter by Doreen Meeks PA-C at      Author: Doreen Meeks PA-C Service: -- Author Type: --    Filed:  Encounter Date: 10/10/2019 Status: Signed         Kirti TOVAR Ernestine  [id number] [place] [id number]       October 10, 2019       Dear Ms. Ernestine,    Below are the results from your recent visit:    Resulted Orders   PFT Complete    Narrative    FEV1/FVC is 81% and is normal.  FEV1 is 2.94L (96%) predicted and is normal.  FVC is 3.65L (99%) predicted and normal.  There was no improvement in spirometry after a single inhaled dose of   bronchodilator.  TLC is 5.11L (105%) predicted and is normal.  RV is 1.63L (108%) predicted and is normal.  DLCO is 23.92ml/min/hg (99%) predicted and is normal when it is corrected   for hemoglobin.  Flow volume loops indicate no abnormalities.    Impression:  Full Pulmonary Function Test is normal.  PFTs are consistent   with no obstructive disease.  Spirometry is not consistent with reversibility.  There is no hyperinflation.  There is no air-trapping.  Diffusion capacity when corrected for hemoglobin is normal.    The ATS criteria for acceptability and reproducibility was met.    Roseann Jarvisqvi  Pulmonary and Critical Care  [id number]              Pulmonary functions are normal, no evidence of asthma or COPD    Please call with questions or contact us using [employer].    Sincerely,        Electronically signed by Doreen Meeks PA-C

## 2021-06-19 NOTE — LETTER
Letter by Doreen Meeks PA-C at      Author: Doreen Meeks PA-C Service: -- Author Type: --    Filed:  Encounter Date: 10/8/2019 Status: Signed         Kirti Sinha  [id number]  [place] [address]             October 8, 2019         Dear Ms. Sinha,    Below are the results from your recent visit:    Resulted Orders   Hemoglobin   Result Value Ref Range    Hemoglobin 11.9 (L) 12.0 - 16.0 g/dL        Mild anemia, multivitamin with iron recommended daily    Please call with questions or contact us using [employer].    Sincerely,        Electronically signed by Doreen Meeks PA-C

## 2021-06-19 NOTE — LETTER
Letter by Heather Beck CNP at      Author: Heather Beck CNP Service: -- Author Type: --    Filed:  Encounter Date: 9/17/2019 Status: (Other)         September 17, 2019     Patient: Kirti Sinha   YOB: 1985   Date of Visit: 9/17/2019       To Whom it May Concern:    Kirti Sinha was seen in my clinic on 9/17/2019.  Please allow her to work at home as much as possible.  She has been experiencing allergy symptoms related to the new building.  Kirti has a documented allergy to dust.    If you have any questions or concerns, please don't hesitate to call.    Sincerely,         Electronically signed by Heather Beck CNP

## 2021-06-20 NOTE — LETTER
Letter by Doreen Meeks PA-C at      Author: Doreen Meeks PA-C Service: -- Author Type: --    Filed:  Encounter Date: 1/10/2020 Status: Signed         Kirti Sinha  [id number] [place] [address]             January 10, 2020         Dear Ms. Sinha,    Below are the results from your recent visit:    Resulted Orders   Thyroid Stimulating Hormone (TSH)   Result Value Ref Range    TSH 1.35 0.30 - 5.00 uIU/mL   Vitamin D, Total (25-Hydroxy)   Result Value Ref Range    Vitamin D, Total (25-Hydroxy) 19.9 (L) 30.0 - 80.0 ng/mL    Narrative    Deficiency <10.0 ng/mL  Insufficiency 10.0-29.9 ng/mL  Sufficiency 30.0-80.0 ng/mL  Toxicity (possible) >100.0 ng/mL   Calcium   Result Value Ref Range    Calcium 8.6 8.5 - 10.5 mg/dL       Labs are normal, with exception of Vitamin D level, med sent to pharmacy.      Please call with questions or contact us using [employer]    Sincerely,        Electronically signed by Doreen Meeks PA-C

## 2021-06-20 NOTE — PROGRESS NOTES
HPI:  Kirti Sinha is a 33 y.o. female who is seen for   Chief Complaint   Patient presents with     [facility] Care     thyroid check, last check 1 year ago   Kirti is seen to reestablish care for her postsurgical hypothyroidism, postsurgical hypoparathyroidism.  She also has new joint pain, especially in her hands.  Other related symptoms heavier periods and irregular periods, pelvic fullness, occasional anxiety, occasional panic attacks.  She has not been taking supplemental calcium, she has not seen an endocrinologist for several years.  She has been seen in primary care in [place] and is now reestablishing in the [facility].  Her thyroidectomy and hypoparathyroidism were treated in endocrinology in [facility] in 2009.  Review of systems: Patient denies chest pain, palpitations, shortness of breath, wheezing, abdominal pain, dyspareunia, pelvic pain, lower leg edema, fever, globus, dysphagia, congestion, sore throat, blurred vision, hearing changes, depression.  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[employer]      Social History Main Topics     Smoking status: Former Smoker     Quit date: 4/30/2012     Smokeless tobacco: Never Used      Comment: former smoker     Alcohol use Yes      Comment: 1-2 x monthly     Drug use: No     Sexual activity: Yes     Other Topics Concern     None     Social History Narrative     Past Surgical History:   Procedure Laterality Date      "CHOLECYSTECTOMY  2014     [place] REMOVAL GALLBLADDER      Description: Cholecystectomy;  Recorded: 12/06/2013;     THYROIDECTOMY  2009     TONSILLECTOMY  2015     TOTAL THYROIDECTOMY       [unfilled]  Allergies   Allergen Reactions     Penicillins Hives     OBJECTIVE:  /66  Pulse 70  Ht 5' 3.5\" (1.613 m)  Wt 161 lb (73 kg)  BMI 28.07 kg/m2  Body mass index is 28.07 kg/(m^2).     Alert, cooperative, well-hydrated. Appears well.  Eyes: Pupils equal, round, reactive to light.  HEENT: Sclera white, nares patent, MMM   Lungs: Clear to auscultation. No retractions, no increased work of respiration, equal chest rise.   Heart: Regular rate and rhythm, no murmurs, clicks,   Gallops.  Abdomen: Soft, bowel sounds in 4 quadrants with no tenderness to palpation, no organomegaly or masses, no aortic or renal bruits.  Extremities: no tenderness to palpation of gastrocnemius, bilaterally.  Skin: no increased warmth, edema, or erythema of lower legs bilaterally.  Back: No cervical, thoracic or lumbar tenderness to spinous processes or musculature.  Labs:  No visits with results within 3 Month(s) from this visit.  Latest known visit with results is:    Office Visit on 10/05/2017   Component Date Value     Chlamydia trachomatis, A* 10/05/2017 Negative      Neisseria gonorrhoeae, A* 10/05/2017 Negative      HIV Antigen / Antibody 10/05/2017 Negative      Syphilis Screen Cascade 10/05/2017 Non-Reactive      TSH 10/05/2017 0.05*     Hepatitis C Ab 10/05/2017 Negative      ASSESMENT/PLAN:  1. Graves disease  Thyroid [place]    Ambulatory referral to Endocrinology   2. Hypoparathyroidism (H)  Comprehensive Metabolic Panel    Ambulatory referral to Endocrinology   3. Heavy periods  HM1(CBC and Differential)    HM1 (CBC with Diff)    Follicle Stimulating Hormone (FSH)    Luteinizing Hormone (LH)    Estradiol    Progesterone   4. Multiple joint pain     5. Screening for lipid disorders  Comprehensive Metabolic Panel    Lipid Cascade "     Discussed the importance of multivitamin with iron during menstruating years and advised to restart a daily multivitamin.  Advised on the importance of calcium supplementation when she is hypoparathyroid, reviewed her low PTH level and low calcium level from her last visit in [facility].  Advised her to start calcium and vitamin D supplementation, continue twice yearly dental exams.  Discussed the importance of calcium and vitamin D to prevent early osteoporosis.  Discussed the effect of calcium for many systems in the body and that her new joint pain and anxiety events could be related to her low calcium.  We will call her with these results, endocrinology consult was placed.  She currently has enough of her thyroid hormone and will call her if this needs to be adjusted and refill her for a year, follow-up in 1 year in primary care.  All questions were answered, she voices understanding.  Doreen Meeks, MS, PA-C 10/04/18

## 2021-06-20 NOTE — PROGRESS NOTES
Estrogen and Prgesterone are normal, please call results to the patient or send a letter if not reachable by phone.

## 2021-06-20 NOTE — LETTER
Letter by Doreen Meeks PA-C at      Author: Doreen Meeks PA-C Service: -- Author Type: --    Filed:  Encounter Date: 2020 Status: Signed         Kirti Sinha  [id number] [place] [address]      2020      Dear Kirti Sinha,   : 1985      This letter is in regards to the appointment that you had scheduled on 2020  at the [facility] with Doreen Meeks.     The [facility] strives to see all patients in a timely manner and we need your help to achieve this.  The above-mentioned appointment was missed and we do not have record of a cancellation by you.  Whenever possible, we request appointment cancellations at least 72 hours in advance.  This time allows us to offer the appointment to another patient in need.      If you feel you have received this letter in error, or if you need to reschedule this appointment, please call our office so that we may update our records.      Sincerely,    [facility]

## 2021-06-20 NOTE — LETTER
Letter by Doreen Meeks PA-C at      Author: Doreen Meeks PA-C Service: -- Author Type: --    Filed:  Encounter Date: 1/10/2020 Status: Signed         Kirti Sinha  [id number] [place] [address]             January 10, 2020         Dear Ms. Sinha,    Below are the results from your recent visit:    Resulted Orders   Thyroid Stimulating Hormone (TSH)   Result Value Ref Range    TSH 1.35 0.30 - 5.00 uIU/mL   Calcium   Result Value Ref Range    Calcium 8.6 8.5 - 10.5 mg/dL        Labs are normal    Please call with questions or contact us using [employer].    Sincerely,        Electronically signed by Doreen Meeks PA-C

## 2021-06-20 NOTE — LETTER
Letter by Severson, Tammie F, LPN at      Author: Severson, Tammie F, LPN Service: -- Author Type: --    Filed:  Encounter Date: 7/10/2020 Status: (Other)       7/10/2020        Kirti M Ernestine  [id number]  [facility] [id number]    COVID-19 Antibody Screen   Date Value Ref Range Status   07/07/2020 Negative  Final     Comment:     No COVID-19 antibodies detected.  Patients within 10 days of symptom onset for  COVID-19 may not produce sufficient levels of detectable antibodies.  Immunocompromised COVID-19 patients may take longer to develop antibodies.     COVID-19 IgG Titer   Date Value Ref Range Status   07/07/2020 Not Applicable  Final     Comment:     Qualitative screen for total antibodies to COVID-19 (SARS-CoV-2) with  semi-quantitative measurement of IgG COVID-19 antibodies by endpoint titer.  COVID-19 antibodies may be elevated due to a past or current infection.  Negative results do not rule out COVID-19 infection.  Results from antibody  testing should not be used as the sole basis to diagnose or exclude SARS-CoV-2  infection or to inform infection status.  COVID-19 PCR test should be ordered  if current infection is suspected.  False positive results may occur in rare  cases due to cross-reacting antibodies.  This test was developed and its performance characteristics determined by the  [facility] ([facility]),  which is regulated under CLIA as qualified to perform high-complexity testing.  This test has not been reviewed by the FDA.  Testing performed by [facility], [facility], [address], [address], [place] [address]       No results found for: [id number]    You have tested NEGATIVE for COVID-19 antibodies. This suggests you have not had or been exposed to COVID-19. But it does not mean that for sure.    The test finds antibodies in most people 10 days after they get sick. For some  people, it takes longer than 10 days for antibodies to show up. Others may never show antibodies against COVID-19, especially if they have weak immune systems.    If you have COVID-19 symptoms now, please stay home and away from others.     Your current symptoms may or may not be COVID-19.     What is antibody testing?  This is a kind of blood test. We take a small sample of your blood, and then test it for something called antibodies.   Your body makes antibodies to fight infection. If your blood has antibodies for a certain germ, it means youve been infected with that germ in the past.   Sometimes, antibodies stay in your body for years after youve had the infection. They can be there even if the germ didnt make you sick. They are a sign that your body fought off the infection.  Will this test find antibodies in everyone whos had COVID-19?  No. The test finds antibodies in most people 10 days after they get sick. For some people, it takes longer than 10 days for antibodies to show up. Others may never show antibodies against COVID-19, especially if they have weak immune systems.  What are the signs of COVID-19?  Signs of COVID-19 can appear from 2 to 14 days (up to 2 weeks) after youre infected. Some people have no symptoms or only mild symptoms. Others get very sick. The most common symptoms are:      Cough    Shortness of breath or trouble breathing    Or at least 2 of these symptoms:      Fever    Chills    Repeated shaking with chills    Muscle pain    Headache    Sore throat    Losing your sense of taste or smell    You may have other symptoms. Please contact your doctor or clinic for any symptoms that worry you.    Where can I get more information?     To learn the [facility] guidelines for staying home, please visit the [facility] website at [email or web address]    To learn more about COVID-19 and how to care for yourself at home, please visit  the CDC website at https://www.cdc.gov/coronavirus/2019-ncov/about/steps-when-sick.html    For more options for care at [facility], please visit our website at [email or web address]    [place] Department of [facility] ([facility]) COVID-19 Hotline:  [phone]

## 2021-06-21 NOTE — PROGRESS NOTES
Assessment:       Procedure site is healing well, without evidence of infection.      Plan:   Kirti is a 33 year old here for suture removal after epidermal cyst removal.     1. 2 sutures were removed.  2. Wound care discussed.  3. Follow up as needed.      Aggie Negrete MD    Subjective:      Kirti Sinha is a 33 y.o. female who underwent epidermal cyst removal in the office on 11/9/18, which required closure with 2 sutures. She denies pain, redness, or drainage from the wound.     Past medical, surgical, social, allergies and medications were reviewed and updated as needed.    Review of Systems  ROS negative     Objective:      /70   Wt 159 lb 8 oz (72.3 kg)   BMI 27.81 kg/m    Exam:  Cyst removal site is healing well, without evidence of infection. mild redness surrounding sutures as expected

## 2021-06-21 NOTE — PROGRESS NOTES
Sebaceous Cyst Excision Procedure Note    Pre-operative Diagnosis: Epidermal cyst    Post-operative Diagnosis: same    Locations:left, upper back; 1.7 x 1.4 cm    Indications: cyst tenderness    Anesthesia: Lidocaine 1% with epinephrine without added sodium bicarbonate  3 ml    Procedure Details   History of allergy to iodine: no    Patient informed of the risks (including bleeding and infection) and benefits of the   procedure and Written informed consent obtained.    The lesion and surrounding area was given a sterile prep using betadyne and draped in the usual sterile fashion. An incision was made over the cyst, which was dissected free of the surrounding tissue and removed.  The cyst was filled with typical sebaceous material.  The wound was closed with two total 3-0 Ethilon using simple interrupted stitches. Antibiotic ointment and a sterile dressing applied.  The specimen was not sent for pathologic examination. The patient tolerated the procedure well.    EBL: 1 ml    Findings:  Sebaceous cyst of left upper back    Condition:  Stable    Complications:  none.    Plan:  1. Instructed to keep the wound dry and covered for 24h and clean thereafter.  2. Warning signs of infection were reviewed.    3. Recommended that the patient use OTC analgesics as needed for pain.   4. Return for suture removal in 10 days.    Aggie Negrete MD

## 2021-06-21 NOTE — PROGRESS NOTES
Assessment/Plan:    1. Epidermal cyst  Epidermal cyst diagnosed clinically today on left upper back.  This is currently not infected or actively draining.  Discussed different management options including: Watchful waiting, popping cyst, or surgical removal of cyst.  Patient opts for surgical removal, we will schedule this next week.      Follow up: In 1 week for cyst removal    Aggie Negrete MD  [facility]    Subjective:    Patient ID: Kirti Sinha is a 33 y.o. female is here today for cyst on back    Cyst  -first noticed over a year ago  -thinks has gotten bigger  -has been more tender over past week, also with headache  -no hx infection/redness or self draining  -no fevers  -no cysts in other areas of skin though does have PCOS  -Cyst is tender from bra strap and purse strap lying on top of it  -Not used any medications or lotions to help with this      Patient Active Problem List   Diagnosis     Graves' Disease     Postoperative Hypoparathyroidism     Postsurgical Hypothyroidism     Insomnia     Trigger finger     Polycystic ovary syndrome     Past Medical History:   Diagnosis Date     Disease of thyroid gland      Past Surgical History:   Procedure Laterality Date     CHOLECYSTECTOMY  2014     [place] REMOVAL GALLBLADDER      Description: Cholecystectomy;  Recorded: 12/06/2013;     THYROIDECTOMY  2009     TONSILLECTOMY  2015     TOTAL THYROIDECTOMY       Current Outpatient Prescriptions on File Prior to Visit   Medication Sig Dispense Refill     levothyroxine (SYNTHROID, LEVOTHROID) 125 MCG tablet Take 1 tablet (125 mcg total) by mouth daily. Recheck thyroid test in 6 weeks. 60 tablet 1     naproxen (NAPROSYN) 250 MG tablet Take 250 mg by mouth as needed.       SUMAtriptan (IMITREX) 100 MG tablet Take 100 mg by mouth every 2 (two) hours as needed for migraine.       tiZANidine (ZANAFLEX) 4 MG tablet Take 1 tablet by mouth as needed.  2     zolpidem (AMBIEN) 10 mg tablet Take 10 mg by  mouth bedtime as needed for sleep.       [DISCONTINUED] fluticasone (FLONASE) 50 mcg/actuation nasal spray Use one spray into each nostril daily. 16 g 0     No current facility-administered medications on file prior to visit.      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    Objective:      /80  Pulse 76  Wt 161 lb 3 oz (73.1 kg)  LMP 10/19/2018  BMI 28.11 kg/m2    General appearance: awake, NAD  Lungs: breathing comfortably on room air  Skin: 1.7 x 1.4 cm cyst on left upper back - not erythematous or draining, no evidence of infection, small blackhead on top  Neuro: alert, oriented x3, CNs grossly intact, no focal deficits appreciated  Psych: normal mood/affect/behavior, answering questions appropriately, linear thought process

## 2021-06-22 NOTE — PROGRESS NOTES
Labs are normal, please call results to the patient or send a letter if not reachable by phone.  range is perfect on new dose of Levothyroxine, continue this dose.

## 2021-06-22 NOTE — TELEPHONE ENCOUNTER
"Medication Question or Clarification  Who is calling: Patient  What medication are you calling about?: Tizanidine   What dose do you take?: 4 mg tab   How often are you taking the medication?: \"take 1 as directed\"  Who prescribed the medication?: Doreen Meeks PA-C  What is your question/concern?: Pharmacy is requesting to remove the as directed and specify when to take  [facility]   Okay to leave a detailed message?: Yes  Site CMT - Please call the pharmacy to obtain any additional needed information.    tiZANidine (ZANAFLEX) 4 MG tablet 30 tablet 0 1/8/2019     Sig: TAKE 1 TABLET BY MOUTH AS DIRECTED        "

## 2021-06-22 NOTE — PROGRESS NOTES
HPI:  Kirti Sinha is a 33 y.o. female who is seen for   Chief Complaint   Patient presents with     Follow-up     Medication follow up, possible thyroid testing   Kirti Sinha returns for recheck after a new dose of levothyroxine.  She is taking 125 mcg now.  She notes that her energy level is improved, she is starting to have some weight loss, her period is still heavy but does drop off to lighter towards the end of the cycle.  She has started taking a multivitamin with iron.  She has started a kickboxing program 3 times a week and also walks in her job.  She also needs a refill of her Ambien, takes 10 mg almost every day but does skip it whenever she has any alcohol like on Friday evenings and during her.  She also skips it.  She denies chest pain, palpitations, shortness of breath, wheezing, claudication, lower leg edema, dyspnea on exertion, cough, fever.  No results found for: HGBA1C  Lab Results   Component Value Date    LDLCALC 145 (H) 10/04/2018    CREATININE 0.68 10/04/2018     Patient Active Problem List   Diagnosis     Graves' Disease     Postoperative Hypoparathyroidism     Postsurgical Hypothyroidism     Insomnia     Trigger finger     Polycystic ovary syndrome     Family History   Problem Relation Age of Onset     Diabetes type II Father      Fibroids Mother      Fibroids Maternal Aunt      Social History     Socioeconomic History     Marital status:      Spouse name: Not on file     Number of children: 2     Years of education: Not on file     Highest education level: Not on file   Social Needs     Financial resource strain: Not on file     Food insecurity - worry: Not on file     Food insecurity - inability: Not on file     Transportation needs - medical: Not on file     Transportation needs - non-medical: Not on file   Occupational History     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[employer]   Tobacco Use     Smoking status: Former Smoker     Last attempt to quit: 4/30/2012      Years since quittin.6     Smokeless tobacco: Never Used     Tobacco comment: former smoker   Substance and Sexual Activity     Alcohol use: Yes     Comment: 1-2 x monthly     Drug use: No     Sexual activity: Yes   Other Topics Concern     Not on file   Social History Narrative     Not on file     Past Surgical History:   Procedure Laterality Date     CHOLECYSTECTOMY  2014     [place] REMOVAL GALLBLADDER      Description: Cholecystectomy;  Recorded: 2013;     THYROIDECTOMY  2009     TONSILLECTOMY  2015     TOTAL THYROIDECTOMY       Current Outpatient Medications on File Prior to Visit   Medication Sig Dispense Refill     levothyroxine (SYNTHROID, LEVOTHROID) 125 MCG tablet Take 1 tablet (125 mcg total) by mouth daily. Recheck thyroid test in 6 weeks. 60 tablet 1     naproxen (NAPROSYN) 250 MG tablet Take 250 mg by mouth as needed.       SUMAtriptan (IMITREX) 100 MG tablet Take 100 mg by mouth every 2 (two) hours as needed for migraine.       tiZANidine (ZANAFLEX) 4 MG tablet Take 1 tablet (4 mg total) by mouth as needed. 30 tablet 1     [DISCONTINUED] zolpidem (AMBIEN) 10 mg tablet Take 1 tablet (10 mg total) by mouth at bedtime as needed for sleep. 30 tablet 0     No current facility-administered medications on file prior to visit.      Allergies   Allergen Reactions     Penicillins Hives     OB History     No data available        I have reviewed the patient's medical history in detail and updated the computerized patient record.  OBJECTIVE:  Wt Readings from Last 3 Encounters:   18 156 lb 11.2 oz (71.1 kg)   18 159 lb 8 oz (72.3 kg)   18 161 lb 1 oz (73.1 kg)     Temp Readings from Last 3 Encounters:   18 97.9  F (36.6  C) (Oral)   17 97.5  F (36.4  C) (Oral)   17 97.8  F (36.6  C) (Oral)     BP Readings from Last 3 Encounters:   18 115/62   18 112/70   18 120/80     Pulse Readings from Last 3 Encounters:   18 82   18 76   10/04/18 70     Body  mass index is 27.32 kg/m .     Alert, cooperative, well-hydrated. Appears well.  Eyes: Pupils equal, round, reactive to light.  HEENT: Sclera white, nares patent, MMM.  Neck: supple, without lymphadenopathy, Thyroid freely movable and without hypotrophy or nodularity.   Lungs: Clear to auscultation. No retractions, no increased work of respiration, equal chest rise.   Heart: Regular rate and rhythm, no murmurs, clicks,   Gallops.      Labs:  Office Visit on 10/04/2018   Component Date Value     Sodium 10/04/2018 139      Potassium 10/04/2018 4.0      Chloride 10/04/2018 104      CO2 10/04/2018 26      Anion Gap, Calculation 10/04/2018 9      Glucose 10/04/2018 85      BUN 10/04/2018 13      Creatinine 10/04/2018 0.68      GFR MDRD Af Amer 10/04/2018 >60      GFR MDRD Non Af Amer 10/04/2018 >60      Bilirubin, Total 10/04/2018 0.4      Calcium 10/04/2018 9.0      Protein, Total 10/04/2018 7.8      Albumin 10/04/2018 4.3      Alkaline Phosphatase 10/04/2018 75      AST 10/04/2018 9      ALT 10/04/2018 18      TSH 10/04/2018 5.88*     Cholesterol 10/04/2018 221*     Triglycerides 10/04/2018 122      HDL Cholesterol 10/04/2018 52      LDL Calculated 10/04/2018 145*     Patient Fasting > 8hrs? 10/04/2018 Yes      WBC 10/04/2018 6.6      RBC 10/04/2018 3.94      Hemoglobin 10/04/2018 12.1      Hematocrit 10/04/2018 36.0      MCV 10/04/2018 91      MCH 10/04/2018 30.9      MCHC 10/04/2018 33.7      RDW 10/04/2018 11.0      Platelets 10/04/2018 272      MPV 10/04/2018 7.6      Neutrophils % 10/04/2018 57      Lymphocytes % 10/04/2018 33      Monocytes % 10/04/2018 7      Eosinophils % 10/04/2018 4      Basophils % 10/04/2018 1      Neutrophils Absolute 10/04/2018 3.7      Lymphocytes Absolute 10/04/2018 2.2      Monocytes Absolute 10/04/2018 0.4      Eosinophils Absolute 10/04/2018 0.2      Basophils Absolute 10/04/2018 0.0      FSH 10/04/2018 3.7      LH 10/04/2018 10.6      Estradiol 10/04/2018 88      Progesterone  10/04/2018 5.3      Free T4 10/04/2018 1.0      ASSESMENT/PLAN:  1. Hypothyroidism due to acquired atrophy of thyroid  Thyroid Stimulating Hormone (TSH)    Thyroid Stimulating Hormone (TSH)   2. Other insomnia     We will recheck TSH today and plan to follow-up in 3 months for Ambien refill, drug contract signed, explained that this is a scheduled medication.  Advised not to take it more than prescribed, to occasionally go without this medication to make it more effective.    Doreen Meeks, MS, PA-C 12/17/18

## 2021-06-22 NOTE — TELEPHONE ENCOUNTER
RN cannot approve Refill Request    RN can NOT refill this medication med is not covered by policy/route to provider.    Jay Simmons, [facility] Triage/Med Refill 1/3/2019    Requested Prescriptions   Pending Prescriptions Disp Refills     tiZANidine (ZANAFLEX) 4 MG tablet [Pharmacy Med Name: TIZANIDINE 4MG TABLETS] 30 tablet 0     Sig: TAKE 1 TABLET BY MOUTH AS DIRECTED    There is no refill protocol information for this order

## 2021-06-22 NOTE — TELEPHONE ENCOUNTER
Received fax from pharmacy.    Per pharmacy for the medication Tizanidine please clarify directions.  Need a frequency in order to bill insurance.

## 2021-06-23 NOTE — TELEPHONE ENCOUNTER
RN cannot approve Refill Request    RN can NOT refill this medication med is not covered by policy/route to provider.    Jay Simmons, [facility] Triage/Med Refill 2/7/2019    Requested Prescriptions   Pending Prescriptions Disp Refills     tiZANidine (ZANAFLEX) 4 MG tablet [Pharmacy Med Name: TIZANIDINE 4MG TABLETS] 30 tablet 0     Sig: TAKE 1 TABLET BY MOUTH ONCE AT BEDTIME AS NEEDED    There is no refill protocol information for this order

## 2021-06-23 NOTE — TELEPHONE ENCOUNTER
FYI - Status Update  Who is Calling: Patient  Update: Questioning the status of the below request. Patient stated this request was submitted on 01/09/19, and it is now 0/11/19, and the request still has yet to be addressed. Patient states without the updated directions, the pharmacy will not dispense her medication. Please advise.  Okay to leave a detailed message?:  No return call needed

## 2021-06-25 NOTE — TELEPHONE ENCOUNTER
Controlled Substance Refill Request  Medication Name:   Requested Prescriptions     Pending Prescriptions Disp Refills     zolpidem (AMBIEN) 10 mg tablet 30 tablet 3     Sig: Take 1 tablet (10 mg total) by mouth at bedtime as needed for sleep.     Date Last Fill: 12/17/2018  [facility]      Submit electronically to pharmacy  Controlled Substance Agreement Date Scanned:   Encounter-Level CSA Scan Date:    There are no encounter-level csa scan date.       Last office visit with prescriber/PCP: 3/14/2019 Doreen Meeks PA-C OR same dept: 3/14/2019 Doreen Meeks PA-C OR same specialty: 3/14/2019 Doreen Meeks PA-C  Last physical: Visit date not found Last MTM visit: Visit date not found

## 2021-06-25 NOTE — PROGRESS NOTES
HPI:  Kirti Sinha is a 34 y.o. female who is seen for   Chief Complaint   Patient presents with     Medication Refill     ambien     Follow-up     thyroid   Kirti Sinha states that her symptoms are well controlled on current thyroid medication, she is still having joint and bone pain from her parathyroid concerns.  She was set up to have an appointment in endocrine and this got canceled due to the endocrinologist not being available here.  She has not rescheduled yet.  She notes that this joint pain gets worse during premenstrual and.  Times and then improves.  She has hand and knee pain mostly.  She has been using her muscle relaxer for times that she has more muscle pain and it is helping.  Aches Ambien occasionally for sleep, this is working well, she does not need a refill today.    No results found for: HGBA1C  Lab Results   Component Value Date    LDLCALC 145 (H) 10/04/2018    CREATININE 0.68 10/04/2018     Patient Active Problem List   Diagnosis     Graves' Disease     Postoperative Hypoparathyroidism     Postsurgical Hypothyroidism     Insomnia     Trigger finger     Polycystic ovary syndrome     Family History   Problem Relation Age of Onset     Diabetes type II Father      Fibroids Mother      Fibroids Maternal Aunt      Social History     Socioeconomic History     Marital status:      Spouse name: None     Number of children: 2     Years of education: None     Highest education level: None   Occupational History     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[employer]   Social Needs     Financial resource strain: None     Food insecurity:     Worry: None     Inability: None     Transportation needs:     Medical: None     Non-medical: None   Tobacco Use     Smoking status: Former Smoker     Last attempt to quit: 2012     Years since quittin.8     Smokeless tobacco: Never Used     Tobacco comment: former smoker   Substance and Sexual Activity     Alcohol use: Yes     Comment: 1-2  x monthly     Drug use: No     Sexual activity: Yes   Lifestyle     Physical activity:     Days per week: None     Minutes per session: None     Stress: None   Relationships     Social connections:     Talks on phone: None     Gets together: None     Attends [employer] service: None     Active member of club or organization: None     Attends meetings of clubs or organizations: None     Relationship status: None     Intimate partner violence:     Fear of current or ex partner: None     Emotionally abused: None     Physically abused: None     Forced sexual activity: None   Other Topics Concern     None   Social History Narrative     None     Past Surgical History:   Procedure Laterality Date     CHOLECYSTECTOMY  2014     [place] REMOVAL GALLBLADDER      Description: Cholecystectomy;  Recorded: 12/06/2013;     THYROIDECTOMY  2009     TONSILLECTOMY  2015     TOTAL THYROIDECTOMY       Current Outpatient Medications on File Prior to Visit   Medication Sig Dispense Refill     levothyroxine (SYNTHROID, LEVOTHROID) 125 MCG tablet TAKE 1 TABLET BY MOUTH DAILY 60 tablet 0     naproxen (NAPROSYN) 250 MG tablet Take 250 mg by mouth as needed.       SUMAtriptan (IMITREX) 100 MG tablet Take 100 mg by mouth every 2 (two) hours as needed for migraine.       tiZANidine (ZANAFLEX) 4 MG tablet TAKE 1 TABLET BY MOUTH ONCE AT BEDTIME AS NEEDED 30 tablet 0     zolpidem (AMBIEN) 10 mg tablet Take 1 tablet (10 mg total) by mouth at bedtime as needed for sleep. 30 tablet 3     No current facility-administered medications on file prior to visit.      Allergies   Allergen Reactions     Penicillins Hives     OB History     No data available        I have reviewed the patient's medical history in detail and updated the computerized patient record.  OBJECTIVE:  Wt Readings from Last 3 Encounters:   03/14/19 157 lb 9.6 oz (71.5 kg)   12/17/18 156 lb 11.2 oz (71.1 kg)   11/20/18 159 lb 8 oz (72.3 kg)     Temp Readings from Last 3 Encounters:   12/17/18  97.9  F (36.6  C) (Oral)   04/06/17 97.5  F (36.4  C) (Oral)   02/14/17 97.8  F (36.6  C) (Oral)     BP Readings from Last 3 Encounters:   03/14/19 118/70   12/17/18 115/62   11/20/18 112/70     Pulse Readings from Last 3 Encounters:   03/14/19 76   12/17/18 82   11/02/18 76     Body mass index is 27.48 kg/m .     Alert, cooperative, well-hydrated. Appears well.  Eyes: Pupils equal, round, reactive to light.  HEENT: Sclera white, nares patent, MMM.  Extremities: Upper extremities:  strength 2 out of 4, no evidence of increased bony prominences of joints to PIPs bilaterally.  No increased warmth or swelling.  Knees: Some crepitus with movement of patella, patella is freely moving, range of motion normal, no varus or valgus bilaterally.    Labs:  Office Visit on 12/17/2018   Component Date Value     TSH 12/17/2018 0.72      ASSESMENT/PLAN:  1. Other hypoparathyroidism (H)     Patient was sent to specialty  to get a different referral since endocrinologist is not available.  She is comfortable with this plan.  Advised to continue to use over-the-counter treatments for pain, refill as needed for Ambien and thyroid medications.  Follow-up in 6 months for physical.  Doreen Meeks, MS, PA-C 03/15/19   No

## 2021-06-25 NOTE — TELEPHONE ENCOUNTER
RN cannot approve Refill Request    RN can NOT refill this medication med is not covered by policy/route to provider. Last office visit: 3/14/2019 Doreen Meeks PA-C Last Physical: Visit date not found Last MTM visit: Visit date not found Last visit same specialty: 3/14/2019 Doreen Meeks PA-C.  Next visit within 3 mo: Visit date not found  Next physical within 3 mo: Visit date not found      Riddhi Tsai, [facility] Triage/Med Refill 3/17/2019    Requested Prescriptions   Pending Prescriptions Disp Refills     tiZANidine (ZANAFLEX) 4 MG tablet [Pharmacy Med Name: TIZANIDINE 4MG TABLETS] 30 tablet 0     Sig: TAKE 1 TABLET BY MOUTH ONCE AT BEDTIME AS NEEDED    There is no refill protocol information for this order

## 2021-06-27 NOTE — PROGRESS NOTES
Progress Notes by Symone Hartley at 7/30/2019  8:00 AM     Author: Symone Hartley Service: -- Author Type: Medical Student    Filed: 8/2/2019  7:03 PM Encounter Date: 7/30/2019 Status: Attested    : Symone Hartley (Medical Student) Cosigner: Doreen Meeks PA-C at 8/2/2019  7:03 PM    Attestation signed by Doreen Meeks PA-C at 8/2/2019  7:03 PM    Medical Student's history and exam were reviewed and confirmed by Doreen Meeks PA-C                HPI:  Kirti Sinha is a 34 y.o. female who is seen for:  Chief Complaint   Patient presents with   ? Thyroid Problem     reheck thyroid    ? Personal Problem     would like anxiety med for flying    Kirti Sinha has a history of Graves disease, thyroidectomy, PCOS, and hypothyroidism seen for a thyroid recheck and anxiety. Denies changes in weight, cold intolerance, tremor, chest pain, or palpitations. Has had heavy menses for the past year. Takes levothyroxine 125 mg once daily, is this tolerating well.  She has been experiencing increased anxiety regarding leaving her son with autism while she travels for work. Her trip is next week for 10 days. Her  will watch he son but she thinks the son's services and cares could be too much to handle. She had a panic attack Thursday night, lasting 1 hour; she was hyperventilating and felt like she could not swallow. The last time she had a panic attack was in February when she was traveling on the plane. She sees a therapist, goes to family therapy and her son's autism therapy.   She is taking zolpidem most nights and is tolerating this well.  No results found for: HGBA1C  Lab Results   Component Value Date    LDLCALC 145 (H) 10/04/2018    CREATININE 0.68 10/04/2018     Patient Active Problem List   Diagnosis   ? Graves' Disease   ? Postoperative Hypoparathyroidism   ? Postsurgical Hypothyroidism   ? Insomnia   ? Trigger finger   ? Polycystic ovary syndrome      Family History   Problem Relation Age of Onset   ? Diabetes type II Father    ? Fibroids Mother    ? Fibroids Maternal Aunt      Social History     Socioeconomic History   ? Marital status:      Spouse name: None   ? Number of children: 2   ? Years of education: None   ? Highest education level: None   Occupational History   ? 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 Financial resource strain: None   ? Food insecurity:     Worry: None     Inability: None   ? Transportation needs:     Medical: None     Non-medical: None   Tobacco Use   ? Smoking status: Former Smoker     Last attempt to quit: 2012     Years since quittin.2   ? Smokeless tobacco: Never Used   ? Tobacco comment: former smoker   Substance and Sexual Activity   ? Alcohol use: Yes     Comment: 1-2 x monthly   ? Drug use: No   ? Sexual activity: Yes   Lifestyle   ? Physical activity:     Days per week: None     Minutes per session: None   ? Stress: None   Relationships   ? Social connections:     Talks on phone: None     Gets together: None     Attends Rastafari service: None     Active member of club or organization: None     Attends meetings of clubs or organizations: None     Relationship status: None   ? Intimate partner violence:     Fear of current or ex partner: None     Emotionally abused: None     Physically abused: None     Forced sexual activity: None   Other Topics Concern   ? None   Social History Narrative   ? None     Past Surgical History:   Procedure Laterality Date   ? CHOLECYSTECTOMY     ? [place] REMOVAL GALLBLADDER      Description: Cholecystectomy;  Recorded: 2013;   ? THYROIDECTOMY     ? TONSILLECTOMY     ? TOTAL THYROIDECTOMY       Current Outpatient Medications on File Prior to Visit   Medication Sig Dispense Refill   ? levothyroxine (SYNTHROID, LEVOTHROID) 125 MCG tablet TAKE 1 TABLET BY MOUTH DAILY 90 tablet 1   ? SUMAtriptan (IMITREX) 100 MG tablet Take 100 mg by mouth every 2 (two)  hours as needed for migraine.     ? [DISCONTINUED] tiZANidine (ZANAFLEX) 4 MG tablet TAKE 1 TABLET BY MOUTH ONCE DAILY AT BEDTIME AS NEEDED 30 tablet 0   ? [DISCONTINUED] zolpidem (AMBIEN) 10 mg tablet Take 1 tablet (10 mg total) by mouth at bedtime as needed for sleep. 30 tablet 0     No current facility-administered medications on file prior to visit.      Allergies   Allergen Reactions   ? Penicillins Hives     OB History    None       I have reviewed the patient's medical history in detail and updated the computerized patient record.  OBJECTIVE:  Wt Readings from Last 3 Encounters:   07/30/19 157 lb 1.6 oz (71.3 kg)   07/11/19 157 lb 1.6 oz (71.3 kg)   03/14/19 157 lb 9.6 oz (71.5 kg)     Temp Readings from Last 3 Encounters:   12/17/18 97.9  F (36.6  C) (Oral)   04/06/17 97.5  F (36.4  C) (Oral)   02/14/17 97.8  F (36.6  C) (Oral)     BP Readings from Last 3 Encounters:   07/30/19 128/81   07/11/19 110/68   03/14/19 118/70     Pulse Readings from Last 3 Encounters:   07/30/19 79   07/11/19 77   03/14/19 76     Body mass index is 27.39 kg/m .     Alert, cooperative, well-hydrated. Appears well.  Neck: supple, without lymphadenopathy, Thyroid freely movable and without hypotrophy or nodularity.   Lungs: Clear to auscultation. No retractions, no increased work of respiration, equal chest rise.   Heart: Regular rate and rhythm, no murmurs, clicks, or gallops.  Labs:  No visits with results within 3 Month(s) from this visit.   Latest known visit with results is:   Office Visit on 12/17/2018   Component Date Value   ? TSH 12/17/2018 0.72      ASSESMENT/PLAN:  1. ROMMEL (generalized anxiety disorder)  busPIRone (BUSPAR) 5 MG tablet   2. Insomnia, unspecified type  zolpidem (AMBIEN) 10 mg tablet   3. Hypothyroidism, unspecified type     4. Graves disease  Thyroid Stimulating Hormone (TSH)   Hypothyroidism- will continue levothyroxine dose as prescribed. TSH rechecked today. Will follow-up in 3 months for recheck.  ROMMEL-  buspirone 5 mg two times a day prescribed to help with anxiety and upcoming work trip. Can take three times a day if feeling really anxious. Will call on Friday if does not see any reduction in anxiety. Will consider short course of a benzodiazepine at that time if necessary, since patient is already on zolpidem.    Symone Hartley, MS3  I was present with the medical student who participated in the serviceand in the documentation of this note. I have verified the history and personally performed the physical exam and medical decision making, and have verified the content of the note, which accurately reflects my assessment of the patient and the plan of care.  Doreen Meeks, MS, PA-C 07/30/19

## 2021-06-28 NOTE — PROGRESS NOTES
Progress Notes by Heather Beck CNP at 9/17/2019  3:00 PM     Author: Heather Beck CNP Service: -- Author Type: Nurse Practitioner    Filed: 9/17/2019  3:44 PM Encounter Date: 9/17/2019 Status: Signed    : Heather Beck CNP (Nurse Practitioner)       Chief Complaint   Patient presents with   ? Migraine   ? 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        ASSESSMENT & PLAN:   Diagnoses and all orders for this visit:    Non-seasonal allergic rhinitis due to other allergic trigger  -     cetirizine (ZYRTEC) 10 MG tablet; Take 1 tablet (10 mg total) by mouth daily.  Dispense: 30 tablet; Refill: 2    Burning chest pain  -     aluminum-magnesium hydroxide-simethicone 200-200-20 mg/5 mL suspension 15 mL (MAALOX ADVANCED)  -     viscous lidocaine HC 2 % solution 15 mL  -     famotidine (PEPCID) 20 MG tablet; Take 1 tablet (20 mg total) by mouth 2 (two) times a day.  Dispense: 28 tablet; Refill: 1        MDM:  Patient reports partial relief of chest discomfort symptoms with GI cocktail.  Patient also has a history of anxiety and this may be playing a part as well.  Do not believe she has a PE or anything dangerous going on related to chest issue due to normal lung sounds, normal vital signs, no leg pain or swelling.    Patient to restart Zyrtec.  Work note given to reduce time in the building.  Patient does have a dust allergy.     Follow-up in 1 week if symptoms are persistent.    Supportive care discussed.  See discharge instructions below for specific recommendations given.    At the end of the encounter, I discussed results, diagnosis, medications. Discussed red flags for immediate return to clinic/ER, as well as indications for follow up if no improvement. Patient and/or caregiver understood and agreed to plan. Patient was stable for discharge.    SUBJECTIVE    HPI:  XIMENA Sinha presents to  the walk-in clinic with hives, shortness of breath and burning sensation in throat associated with remodeling project at work to include testing and painting.  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  Sx started soon after. Other people have felt ill in this building.      Says she has chemical sensitivities in general to smells particular.  Had allergy testing done at the [facility] 2 years ago and reports being allergic to dust. Took Benadryl last night.  Worked a bit for itching and fell asleep.  Breathing issue continued after waking up.      Additionally, patient is complaining of a headache.  Does have a history of migraines.  Took imitex yesterday.  Headache better now.      Denies h/o GERD except for pregnancy.  Hx of thyroid removal.      History obtained from the patient.    Past Medical History:   Diagnosis Date   ? Disease of thyroid gland        Active Ambulatory (Non-Hospital) Problems    Diagnosis   ? Trigger finger   ? Graves' Disease   ? Postoperative Hypoparathyroidism   ? Postsurgical Hypothyroidism   ? Insomnia   ? Polycystic ovary syndrome       Family History   Problem Relation Age of Onset   ? Diabetes type II Father    ? Fibroids Mother    ? Fibroids Maternal Aunt        Social History     Tobacco Use   ? Smoking status: Former Smoker     Last attempt to quit: 2012     Years since quittin.3   ? Smokeless tobacco: Never Used   ? Tobacco comment: former smoker   Substance Use Topics   ? Alcohol use: Yes     Comment: 1-2 x monthly       Review of Systems   Constitutional: Negative for chills and fever.   HENT: Positive for congestion. Negative for postnasal drip, rhinorrhea, sinus pain and sneezing.    Eyes: Positive for itching (in building ).   Respiratory: Positive for chest tightness.    Gastrointestinal:        Burning in throat radiating down.  Better after at home.         OBJECTIVE    Vitals:    19 1458   BP: 105/65    Patient Site: Right Arm   Patient Position: Sitting   Cuff Size: Adult Regular   Pulse: 81   Resp: 20   Temp: 98.2  F (36.8  C)   TempSrc: Oral   SpO2: 97%   Weight: 157 lb 2 oz (71.3 kg)       Physical Exam   Constitutional: She is oriented to person, place, and time. She appears well-developed and well-nourished. No distress.   HENT:   Right Ear: External ear normal.   Left Ear: External ear normal.   Eyes: Conjunctivae are normal. Right eye exhibits no discharge. Left eye exhibits no discharge.   Cardiovascular: Normal rate, regular rhythm, normal heart sounds and intact distal pulses.   Pulmonary/Chest: Effort normal and breath sounds normal. She has no wheezes.   Abdominal: There is no tenderness.   Musculoskeletal: Normal range of motion.   Lymphadenopathy:     She has no cervical adenopathy.   Neurological: She is alert and oriented to person, place, and time.   Skin: Skin is warm and dry. Capillary refill takes less than 2 seconds.   Psychiatric: She has a normal mood and affect. Her behavior is normal. Judgment and thought content normal.       Labs:  No results found for this or any previous visit (from the past 240 hour(s)).      Radiology:    No results found.    PATIENT INSTRUCTIONS:   Patient Instructions   Zyrtec once daily for allergy symptoms.      Pepcid twice daily for possible stomach acid symptoms for 14 days.    Take 1 of each pill today when you get home.    Follow-up if you are not feeling much better in about a week.    Work at home if possible.              Patient Education     Allergic Rhinitis  Allergic rhinitis is an allergic reaction that affects the nose, and often the eyes. Its often known as nasal allergies. Nasal allergies are often due to things in the environment that are breathed in. Depending what you are sensitive to, nasal allergies may occur only during certain seasons. Or they may occur year round. Common indoor allergens include house dust mites, mold, cockroaches, and  pet dander. Outdoor allergens include pollen from trees, grasses, and weeds.   Symptoms include a drippy, stuffy, and itchy nose. They also include sneezing and red and itchy eyes. You may feel tired more often. Severe allergies may also affect your breathing and trigger a condition called asthma.   Tests can be done to see what allergens are affecting you. You may be referred to an allergy specialist for testing and further evaluation.  Home care  Your healthcare provider may prescribe medicines to help relieve allergy symptoms. These may include oral medicines, nasal sprays, or eye drops.  Ask your provider for advice on how to avoid substances that you are allergic to. Below are a few tips for each type of allergen.  Pet dander:    Do not have pets with fur and feathers.    If you can't avoid having a pet, keep it out of your bedroom and off upholstered furniture.  Pollen:    When pollen counts are high, keep windows of your car and home closed. If possible, use an air conditioner instead.    Wear a filter mask when mowing or doing yard work.  House dust mites:    Wash bedding every week in warm water and detergent and dry on a hot setting.    Cover the mattress, box spring, and pillows with allergy covers.     If possible, sleep in a room with no carpet, curtains, or upholstered furniture.  Cockroaches:    Store food in sealed containers.    Remove garbage from the home promptly.    Fix water leaks  Mold:    Keep humidity low by using a dehumidifier or air conditioner. Keep the dehumidifier and air conditioner clean and free of mold.    Clean moldy areas with bleach and water.  In general:    Vacuum once or twice a week. If possible, use a vacuum with a high-efficiency particulate air (HEPA) filter.    Do not smoke. Avoid cigarette smoke. Cigarette smoke is an irritant that can make symptoms worse.  Follow-up care  Follow up as advised by the healthcare provider or our staff. If you were referred to an allergy  specialist, make this appointment promptly.  When to seek medical advice  Call your healthcare provider right away if the following occur:    Coughing or wheezing    Fever of 100.4 F (38 C) or higher, or as directed by your healthcare provider    Raised red bumps (hives)    Continuing symptoms, new symptoms, or worsening symptoms  Call 911 if you have:    Trouble breathing    Severe swelling of the face or severe itching of the eyes or mouth  Date Last Reviewed: 3/1/2017    0944-2273 [employer]. [address] [place] [address]. All rights reserved. This information is not intended as a substitute for professional medical care. Always follow your healthcare professional's instructions.

## 2021-07-03 NOTE — ADDENDUM NOTE
Addendum Note by Soheila Pelayo MD at 7/7/2020  9:00 AM     Author: Soheila Pelayo MD Service: -- Author Type: Physician    Filed: 7/8/2020 11:57 AM Encounter Date: 7/7/2020 Status: Signed    : Soheila Pelayo MD (Physician)    Addended by: SOHEILA PELAYO on: 7/8/2020 11:57 AM        Modules accepted: Orders

## 2021-07-03 NOTE — ADDENDUM NOTE
Addendum Note by Shelly Meeks PA-C at 4/24/2020 11:15 AM     Author: Shelly Meeks PA-C Service: -- Author Type: Physician Assistant    Filed: 4/24/2020 11:15 AM Encounter Date: 4/21/2020 Status: Signed    : Shelly Meeks PA-C (Physician Assistant)    Addended by: SHELLY MEEKS on: 4/24/2020 11:15 AM        Modules accepted: Orders

## 2021-07-03 NOTE — ADDENDUM NOTE
Addendum Note by Soheila Pelayo MD at 7/7/2020  9:00 AM     Author: Soheila Pelayo MD Service: -- Author Type: Physician    Filed: 7/7/2020 10:57 AM Encounter Date: 7/7/2020 Status: Signed    : Soheila Pelayo MD (Physician)    Addended by: SOHEILA PELAYO on: 7/7/2020 10:57 AM        Modules accepted: Orders

## 2021-07-03 NOTE — ADDENDUM NOTE
Addendum Note by Tylor Manjarrez CMA at 1/8/2019  4:45 PM     Author: Tylor Manjarrez CMA Service: -- Author Type: Certified Medical Assistant    Filed: 1/8/2019  4:45 PM Encounter Date: 1/2/2019 Status: Signed    : Tylor Manjarrez CMA (Certified Medical Assistant)    Addended by: TYLOR MANJARREZ on: 1/8/2019 04:45 PM        Modules accepted: Orders

## 2021-09-26 ENCOUNTER — HEALTH MAINTENANCE LETTER (OUTPATIENT)
Age: 36
End: 2021-09-26

## 2022-07-02 ENCOUNTER — HEALTH MAINTENANCE LETTER (OUTPATIENT)
Age: 37
End: 2022-07-02

## 2023-04-23 ENCOUNTER — HEALTH MAINTENANCE LETTER (OUTPATIENT)
Age: 38
End: 2023-04-23

## 2023-07-15 ENCOUNTER — HEALTH MAINTENANCE LETTER (OUTPATIENT)
Age: 38
End: 2023-07-15